# Patient Record
Sex: MALE | Race: ASIAN | NOT HISPANIC OR LATINO | Employment: UNEMPLOYED | URBAN - METROPOLITAN AREA
[De-identification: names, ages, dates, MRNs, and addresses within clinical notes are randomized per-mention and may not be internally consistent; named-entity substitution may affect disease eponyms.]

---

## 2019-11-22 ENCOUNTER — HOSPITAL ENCOUNTER (EMERGENCY)
Facility: HOSPITAL | Age: 44
End: 2019-11-23
Attending: EMERGENCY MEDICINE

## 2019-11-22 VITALS
HEART RATE: 88 BPM | SYSTOLIC BLOOD PRESSURE: 140 MMHG | RESPIRATION RATE: 20 BRPM | OXYGEN SATURATION: 99 % | TEMPERATURE: 97.5 F | DIASTOLIC BLOOD PRESSURE: 80 MMHG

## 2019-11-22 DIAGNOSIS — F29 PSYCHOSIS (HCC): ICD-10-CM

## 2019-11-22 DIAGNOSIS — F30.10 MANIC BEHAVIOR (HCC): Primary | ICD-10-CM

## 2019-11-22 LAB
ALBUMIN SERPL BCP-MCNC: 4.2 G/DL (ref 3.5–5)
ALP SERPL-CCNC: 75 U/L (ref 46–116)
ALT SERPL W P-5'-P-CCNC: 54 U/L (ref 12–78)
AMPHETAMINES SERPL QL SCN: POSITIVE
ANION GAP SERPL CALCULATED.3IONS-SCNC: 13 MMOL/L (ref 4–13)
AST SERPL W P-5'-P-CCNC: 35 U/L (ref 5–45)
BARBITURATES UR QL: NEGATIVE
BASOPHILS # BLD AUTO: 0.05 THOUSANDS/ΜL (ref 0–0.1)
BASOPHILS NFR BLD AUTO: 1 % (ref 0–1)
BENZODIAZ UR QL: NEGATIVE
BILIRUB SERPL-MCNC: 1.11 MG/DL (ref 0.2–1)
BUN SERPL-MCNC: 13 MG/DL (ref 5–25)
CALCIUM SERPL-MCNC: 9.8 MG/DL (ref 8.3–10.1)
CHLORIDE SERPL-SCNC: 103 MMOL/L (ref 100–108)
CO2 SERPL-SCNC: 25 MMOL/L (ref 21–32)
COCAINE UR QL: NEGATIVE
CREAT SERPL-MCNC: 0.99 MG/DL (ref 0.6–1.3)
EOSINOPHIL # BLD AUTO: 0.04 THOUSAND/ΜL (ref 0–0.61)
EOSINOPHIL NFR BLD AUTO: 0 % (ref 0–6)
ERYTHROCYTE [DISTWIDTH] IN BLOOD BY AUTOMATED COUNT: 12.9 % (ref 11.6–15.1)
ETHANOL EXG-MCNC: 0 MG/DL
GFR SERPL CREATININE-BSD FRML MDRD: 93 ML/MIN/1.73SQ M
GLUCOSE SERPL-MCNC: 86 MG/DL (ref 65–140)
HCT VFR BLD AUTO: 47.2 % (ref 36.5–49.3)
HGB BLD-MCNC: 16.1 G/DL (ref 12–17)
IMM GRANULOCYTES # BLD AUTO: 0.03 THOUSAND/UL (ref 0–0.2)
IMM GRANULOCYTES NFR BLD AUTO: 0 % (ref 0–2)
LYMPHOCYTES # BLD AUTO: 1.42 THOUSANDS/ΜL (ref 0.6–4.47)
LYMPHOCYTES NFR BLD AUTO: 15 % (ref 14–44)
MCH RBC QN AUTO: 29.1 PG (ref 26.8–34.3)
MCHC RBC AUTO-ENTMCNC: 34.1 G/DL (ref 31.4–37.4)
MCV RBC AUTO: 85 FL (ref 82–98)
METHADONE UR QL: NEGATIVE
MONOCYTES # BLD AUTO: 0.38 THOUSAND/ΜL (ref 0.17–1.22)
MONOCYTES NFR BLD AUTO: 4 % (ref 4–12)
NEUTROPHILS # BLD AUTO: 7.55 THOUSANDS/ΜL (ref 1.85–7.62)
NEUTS SEG NFR BLD AUTO: 80 % (ref 43–75)
NRBC BLD AUTO-RTO: 0 /100 WBCS
OPIATES UR QL SCN: NEGATIVE
PCP UR QL: NEGATIVE
PLATELET # BLD AUTO: 168 THOUSANDS/UL (ref 149–390)
PMV BLD AUTO: 10.4 FL (ref 8.9–12.7)
POTASSIUM SERPL-SCNC: 4 MMOL/L (ref 3.5–5.3)
PROT SERPL-MCNC: 7.8 G/DL (ref 6.4–8.2)
RBC # BLD AUTO: 5.53 MILLION/UL (ref 3.88–5.62)
SODIUM SERPL-SCNC: 141 MMOL/L (ref 136–145)
THC UR QL: POSITIVE
TSH SERPL DL<=0.05 MIU/L-ACNC: 0.79 UIU/ML (ref 0.36–3.74)
WBC # BLD AUTO: 9.47 THOUSAND/UL (ref 4.31–10.16)

## 2019-11-22 PROCEDURE — 84443 ASSAY THYROID STIM HORMONE: CPT | Performed by: EMERGENCY MEDICINE

## 2019-11-22 PROCEDURE — 85025 COMPLETE CBC W/AUTO DIFF WBC: CPT | Performed by: EMERGENCY MEDICINE

## 2019-11-22 PROCEDURE — 82075 ASSAY OF BREATH ETHANOL: CPT | Performed by: EMERGENCY MEDICINE

## 2019-11-22 PROCEDURE — 80307 DRUG TEST PRSMV CHEM ANLYZR: CPT | Performed by: EMERGENCY MEDICINE

## 2019-11-22 PROCEDURE — 99283 EMERGENCY DEPT VISIT LOW MDM: CPT | Performed by: EMERGENCY MEDICINE

## 2019-11-22 PROCEDURE — 93005 ELECTROCARDIOGRAM TRACING: CPT

## 2019-11-22 PROCEDURE — 99285 EMERGENCY DEPT VISIT HI MDM: CPT

## 2019-11-22 PROCEDURE — 80053 COMPREHEN METABOLIC PANEL: CPT | Performed by: EMERGENCY MEDICINE

## 2019-11-22 PROCEDURE — 36415 COLL VENOUS BLD VENIPUNCTURE: CPT | Performed by: EMERGENCY MEDICINE

## 2019-11-22 NOTE — ED PROCEDURE NOTE
PROCEDURE  ECG 12 Lead Documentation Only  Date/Time: 11/22/2019 3:18 PM  Performed by: Kylie Ruano MD  Authorized by: Kylie Ruano MD     Indications / Diagnosis:   acting strangly   ECG reviewed by me, the ED Provider: yes    Patient location:  ED and bedside  Previous ECG:     Previous ECG:  Unavailable    Comparison to cardiac monitor: Yes    Interpretation:     Interpretation: non-specific    Rate:     ECG rate:  75    ECG rate assessment: normal    Rhythm:     Rhythm: sinus rhythm    Ectopy:     Ectopy: none    QRS:     QRS axis:  Normal    QRS intervals:  Normal  Conduction:     Conduction: abnormal      Abnormal conduction: incomplete RBBB    ST segments:     ST segments:  Normal  T waves:     T waves: flattening      Flattening:  AVL, III, V1, V5 and V6  Q waves:     Q waves:  V1  Comments:      No ecg signs of ischemia/ injury          Kylie Ruano MD  11/22/19 9835

## 2019-11-23 ENCOUNTER — HOSPITAL ENCOUNTER (INPATIENT)
Facility: HOSPITAL | Age: 44
LOS: 4 days | Discharge: HOME | DRG: 885 | End: 2019-11-27
Attending: PSYCHIATRY & NEUROLOGY | Admitting: PSYCHIATRY & NEUROLOGY

## 2019-11-23 DIAGNOSIS — F30.10 MANIC BEHAVIOR (HCC): ICD-10-CM

## 2019-11-23 DIAGNOSIS — F31.12 BIPOLAR DISORDER, CURR EPISODE MANIC W/O PSYCHOTIC FEATURES, MODERATE (HCC): Primary | ICD-10-CM

## 2019-11-23 PROBLEM — F90.2 ATTENTION DEFICIT HYPERACTIVITY DISORDER (ADHD), COMBINED TYPE: Status: ACTIVE | Noted: 2019-11-23

## 2019-11-23 PROBLEM — F29 PSYCHOSIS (HCC): Status: ACTIVE | Noted: 2019-11-23

## 2019-11-23 PROBLEM — F12.10 MARIJUANA ABUSE: Status: ACTIVE | Noted: 2019-11-23

## 2019-11-23 PROBLEM — Z00.8 MEDICAL CLEARANCE FOR PSYCHIATRIC ADMISSION: Status: ACTIVE | Noted: 2019-11-23

## 2019-11-23 PROCEDURE — 99253 IP/OBS CNSLTJ NEW/EST LOW 45: CPT | Performed by: FAMILY MEDICINE

## 2019-11-23 PROCEDURE — 99222 1ST HOSP IP/OBS MODERATE 55: CPT | Performed by: PSYCHIATRY & NEUROLOGY

## 2019-11-23 RX ORDER — OLANZAPINE 5 MG/1
5 TABLET ORAL EVERY 6 HOURS PRN
Status: DISCONTINUED | OUTPATIENT
Start: 2019-11-23 | End: 2019-11-27 | Stop reason: HOSPADM

## 2019-11-23 RX ORDER — HALOPERIDOL 5 MG
5 TABLET ORAL EVERY 6 HOURS PRN
Status: DISCONTINUED | OUTPATIENT
Start: 2019-11-23 | End: 2019-11-27 | Stop reason: HOSPADM

## 2019-11-23 RX ORDER — HALOPERIDOL 5 MG/ML
5 INJECTION INTRAMUSCULAR EVERY 6 HOURS PRN
Status: DISCONTINUED | OUTPATIENT
Start: 2019-11-23 | End: 2019-11-27 | Stop reason: HOSPADM

## 2019-11-23 RX ORDER — OLANZAPINE 10 MG/1
5 INJECTION, POWDER, LYOPHILIZED, FOR SOLUTION INTRAMUSCULAR EVERY 6 HOURS PRN
Status: DISCONTINUED | OUTPATIENT
Start: 2019-11-23 | End: 2019-11-27 | Stop reason: HOSPADM

## 2019-11-23 RX ORDER — LORAZEPAM 1 MG/1
1 TABLET ORAL EVERY 6 HOURS PRN
Status: DISCONTINUED | OUTPATIENT
Start: 2019-11-23 | End: 2019-11-27 | Stop reason: HOSPADM

## 2019-11-23 RX ORDER — MAGNESIUM HYDROXIDE/ALUMINUM HYDROXICE/SIMETHICONE 120; 1200; 1200 MG/30ML; MG/30ML; MG/30ML
30 SUSPENSION ORAL EVERY 4 HOURS PRN
Status: CANCELLED | OUTPATIENT
Start: 2019-11-23

## 2019-11-23 RX ORDER — DEXTROAMPHETAMINE SACCHARATE, AMPHETAMINE ASPARTATE MONOHYDRATE, DEXTROAMPHETAMINE SULFATE AND AMPHETAMINE SULFATE 2.5; 2.5; 2.5; 2.5 MG/1; MG/1; MG/1; MG/1
10 CAPSULE, EXTENDED RELEASE ORAL EVERY MORNING
COMMUNITY
End: 2019-11-27 | Stop reason: HOSPADM

## 2019-11-23 RX ORDER — MAGNESIUM HYDROXIDE/ALUMINUM HYDROXICE/SIMETHICONE 120; 1200; 1200 MG/30ML; MG/30ML; MG/30ML
30 SUSPENSION ORAL EVERY 4 HOURS PRN
Status: DISCONTINUED | OUTPATIENT
Start: 2019-11-23 | End: 2019-11-27 | Stop reason: HOSPADM

## 2019-11-23 RX ORDER — TRAZODONE HYDROCHLORIDE 50 MG/1
50 TABLET ORAL
Status: CANCELLED | OUTPATIENT
Start: 2019-11-23

## 2019-11-23 RX ORDER — HALOPERIDOL 5 MG
5 TABLET ORAL EVERY 6 HOURS PRN
Status: CANCELLED | OUTPATIENT
Start: 2019-11-23

## 2019-11-23 RX ORDER — LORAZEPAM 2 MG/ML
1 INJECTION INTRAMUSCULAR EVERY 6 HOURS PRN
Status: DISCONTINUED | OUTPATIENT
Start: 2019-11-23 | End: 2019-11-27 | Stop reason: HOSPADM

## 2019-11-23 RX ORDER — ACETAMINOPHEN 325 MG/1
650 TABLET ORAL EVERY 6 HOURS PRN
Status: DISCONTINUED | OUTPATIENT
Start: 2019-11-23 | End: 2019-11-27 | Stop reason: HOSPADM

## 2019-11-23 RX ORDER — TRAZODONE HYDROCHLORIDE 50 MG/1
50 TABLET ORAL
Status: DISCONTINUED | OUTPATIENT
Start: 2019-11-23 | End: 2019-11-27 | Stop reason: HOSPADM

## 2019-11-23 RX ORDER — LORAZEPAM 1 MG/1
1 TABLET ORAL EVERY 6 HOURS PRN
Status: CANCELLED | OUTPATIENT
Start: 2019-11-23

## 2019-11-23 RX ORDER — BENZTROPINE MESYLATE 1 MG/1
1 TABLET ORAL EVERY 6 HOURS PRN
Status: DISCONTINUED | OUTPATIENT
Start: 2019-11-23 | End: 2019-11-27 | Stop reason: HOSPADM

## 2019-11-23 RX ORDER — ACETAMINOPHEN 325 MG/1
650 TABLET ORAL EVERY 6 HOURS PRN
Status: CANCELLED | OUTPATIENT
Start: 2019-11-23

## 2019-11-23 RX ORDER — BENZTROPINE MESYLATE 1 MG/ML
1 INJECTION INTRAMUSCULAR; INTRAVENOUS EVERY 6 HOURS PRN
Status: CANCELLED | OUTPATIENT
Start: 2019-11-23

## 2019-11-23 RX ORDER — OLANZAPINE 10 MG/1
10 TABLET, ORALLY DISINTEGRATING ORAL
Status: DISCONTINUED | OUTPATIENT
Start: 2019-11-23 | End: 2019-11-25

## 2019-11-23 RX ORDER — ZIPRASIDONE MESYLATE 20 MG/ML
10 INJECTION, POWDER, LYOPHILIZED, FOR SOLUTION INTRAMUSCULAR EVERY 6 HOURS PRN
Status: DISCONTINUED | OUTPATIENT
Start: 2019-11-23 | End: 2019-11-27 | Stop reason: HOSPADM

## 2019-11-23 RX ORDER — ZIPRASIDONE HYDROCHLORIDE 20 MG/1
20 CAPSULE ORAL EVERY 6 HOURS PRN
Status: DISCONTINUED | OUTPATIENT
Start: 2019-11-23 | End: 2019-11-27 | Stop reason: HOSPADM

## 2019-11-23 RX ORDER — BENZTROPINE MESYLATE 1 MG/ML
1 INJECTION INTRAMUSCULAR; INTRAVENOUS EVERY 6 HOURS PRN
Status: DISCONTINUED | OUTPATIENT
Start: 2019-11-23 | End: 2019-11-27 | Stop reason: HOSPADM

## 2019-11-23 RX ORDER — LORAZEPAM 2 MG/ML
1 INJECTION INTRAMUSCULAR EVERY 6 HOURS PRN
Status: CANCELLED | OUTPATIENT
Start: 2019-11-23

## 2019-11-23 RX ORDER — HALOPERIDOL 5 MG/ML
5 INJECTION INTRAMUSCULAR EVERY 6 HOURS PRN
Status: CANCELLED | OUTPATIENT
Start: 2019-11-23

## 2019-11-23 RX ORDER — BENZTROPINE MESYLATE 0.5 MG/1
1 TABLET ORAL EVERY 6 HOURS PRN
Status: CANCELLED | OUTPATIENT
Start: 2019-11-23

## 2019-11-23 RX ADMIN — OLANZAPINE 10 MG: 10 TABLET, ORALLY DISINTEGRATING ORAL at 21:30

## 2019-11-23 RX ADMIN — HALOPERIDOL 5 MG: 5 TABLET ORAL at 17:40

## 2019-11-23 RX ADMIN — LORAZEPAM 1 MG: 1 TABLET ORAL at 17:40

## 2019-11-23 RX ADMIN — BENZTROPINE MESYLATE 1 MG: 1 TABLET ORAL at 03:27

## 2019-11-23 RX ADMIN — LORAZEPAM 1 MG: 1 TABLET ORAL at 03:27

## 2019-11-23 RX ADMIN — HALOPERIDOL 5 MG: 5 TABLET ORAL at 03:27

## 2019-11-23 RX ADMIN — BENZTROPINE MESYLATE 1 MG: 1 TABLET ORAL at 17:40

## 2019-11-23 NOTE — PROGRESS NOTES
Pt presents as manic, disorganized, grandiose, manipulative, quickly dismissive towards staff and angers easily  Attempt made to hold conversation with pt; pt monopolized conversation, talking over RN  When limits were set, pt responds with "alright, then we're done here, you're not even listening " Content was pt explaining his background, his "mathematical formula for comedy" which he explained for prolonged period  Pt admitted at one point he "is delusional, probably due to being overworked " Overall thought pattern is tangential, inconstant with reality, associations are loosely connected  Denies SI, HI, hallucinations  Pt states "you're not keeping me here, I'm keeping me here  You can't keep Josh Lee in a [employer]inder[employer] " Pt also makes numerous inappropriate comments of sexual nature at times  After conversation needed to be cut short, pt regretfully agreed to take recommended ativan, cogentin and haldol after content grew more hostile with pt at one point banging his hand down on the table  Security walk through took place at this time

## 2019-11-23 NOTE — PROGRESS NOTES
Pt in day room talking about all his theories that he is solving and he needs to sit with only people with high IQ like he has because " the lower IQ people will not be able to comprehend what he is trying to say to them  He remains with bizarre, grandiose and disorganized thoughts but is polite and cooperative

## 2019-11-23 NOTE — ED NOTES
Per promise, patient not on file, also patient has no insurance on chart and denies current coverage

## 2019-11-23 NOTE — H&P
Psychiatric Evaluation - [facility] 38 y o  male MRN: [id number]  Unit/Bed#: [facility] 340-01 Encounter: [id number]    Assessment/Plan   Principal Problem:    Psychosis [facility]  Active Problems:    Medical clearance for psychiatric admission    Attention deficit hyperactivity disorder (ADHD), combined type    Marijuana abuse    Plan:     D/C Adderall    Risks, benefits and possible side effects of Medications:   Patient does not verbalize understanding at this time and will require further explanation  Chief Complaint: brought in by police    History of Present Illness     Patient is a 38 y o  male presents with Signs of acute psychosis  Patient was admitted to psychiatric unit on a involuntarily 302 commitment basis  Primary complaints include: agitation, difficulty sleeping, financial problems and increased irritability  Onset of symptoms was gradual starting several months ago with gradually worsening course since that time  Psychosocial Stressors: family, financial, health, marital, occupational, drug and alcohol and social     Patient presents to the Emergency Room after police found patient sleeping [place] his [place] of the [place]  Patient then acted erratic in front of police and patient was brought in for evaluation  Patient is too disorganized and tangential to provide meaningful history  According to chart he  has a lot of stressors including losing his home and 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  He stated he does not remember falling asleep on the side of the road  Patient is grandiose with flight of ideas  He denies homicidal ideation  He only takes Adderall XR prescribed by his PCP  UDS positive for amphetamines  RN spoke to sister and sister stated she is not aware he had ADHD and she believes he first started taking Adderall through his GF         Patient has been resting most of the day after receiving prn for agitation around 4 am today         Psychiatric Review Of Systems:  sleep: yes  appetite changes: no  weight changes: no  energy/anergy: yes  interest/pleasure/anhedonia: no  somatic symptoms: no  anxiety/panic: no  ricardo: yes  guilty/hopeless: no  self injurious behavior/risky behavior: no    Historical Information     Past Psychiatric History:   None  Currently takes Adderall XR 30 mg daily prescribed by his PCP for ADHD    Substance Abuse History:  Social History     Tobacco History     Smoking Status  Never Smoker    Smokeless Tobacco Use  Never Used          Alcohol History     Alcohol Use Status  Not Currently Comment  LAINE          Drug Use     Drug Use Status  Yes Types  Marijuana          Sexual Activity     Sexually Active  Not Currently Comment  LAINE          Activities of Daily Living    Not Asked               Additional Substance Use Detail     Questions Responses    Substance Use Assessment Substance use within the past 12 months    Alcohol Use Frequency Experimented    Cannabis frequency 3 or more times/week    Comment: 3 or more times/week on 11/23/2019     Heroin Frequency Denies use in past 12 months    Cocaine frequency Never used    Comment: Never used on 11/23/2019     Crack Cocaine Frequency Denies use in past 12 months    Methamphetamine Frequency Denies use in past 12 months    Narcotic Frequency Denies use in past 12 months    Benzodiazepine Frequency Denies use in past 12 months    Amphetamine frequency Denies use in past 12 months    Barbituate Frequency Denies use use in past 12 months    Inhalant frequency Never used    Comment: Never used on 11/23/2019     Hallucinogen frequency Never used    Comment: Never used on 11/23/2019     Ecstasy frequency Never used    Comment: Never used on 11/23/2019     Other drug frequency Never used    Comment: Never used on 11/23/2019     Opiate frequency Denies use in past 12 months    Last reviewed by Alyx Lazo MD on 11/23/2019        I have assessed this patient for substance use within the past 12 months    Family Psychiatric History:   Psychiatric Illness none Hospitalization: none    Social History:  Education: high school diploma/GED  Learning Disabilities: denies  Marital history:   Living arrangement, social support: The patient is presently homeless  Occupational History: self-employed  Functioning Relationships: good support system  Other Pertinent History: None      Traumatic History:   Abuse: denies  Other Traumatic Events: n/a    Past Medical History:   Diagnosis Date    ADHD (attention deficit hyperactivity disorder)     Anxiety     Psychiatric illness     Psychosis ([facility] ) 11/23/2019    Substance abuse ([facility] )        Medical Review Of Systems:  Pertinent items are noted in HPI      Meds/Allergies   all current active meds have been reviewed and current meds:   Current Facility-Administered Medications   Medication Dose Route Frequency    acetaminophen (TYLENOL) tablet 650 mg  650 mg Oral Q6H PRN    aluminum-magnesium hydroxide-simethicone (MYLANTA) 200-200-20 mg/5 mL oral suspension 30 mL  30 mL Oral Q4H PRN    benztropine (COGENTIN) injection 1 mg  1 mg Intramuscular Q6H PRN    benztropine (COGENTIN) tablet 1 mg  1 mg Oral Q6H PRN    haloperidol (HALDOL) tablet 5 mg  5 mg Oral Q6H PRN    haloperidol lactate (HALDOL) injection 5 mg  5 mg Intramuscular Q6H PRN    LORazepam (ATIVAN) 2 mg/mL injection 1 mg  1 mg Intramuscular Q6H PRN    LORazepam (ATIVAN) tablet 1 mg  1 mg Oral Q6H PRN    magnesium hydroxide (MILK OF MAGNESIA) 400 mg/5 mL oral suspension 30 mL  30 mL Oral Daily PRN    OLANZapine (ZyPREXA ZYDIS) dispersible tablet 10 mg  10 mg Oral HS    OLANZapine (ZyPREXA) IM injection 5 mg  5 mg Intramuscular Q6H PRN    OLANZapine (ZyPREXA) tablet 5 mg  5 mg Oral Q6H PRN    traZODone (DESYREL) tablet 50 mg  50 mg Oral HS PRN    ziprasidone (GEODON) capsule 20 mg  20 mg Oral Q6H PRN    ziprasidone (GEODON) IM injection 10 mg  10 mg Intramuscular Q6H PRN     No Known Allergies    Objective   Vital signs in last 24 hours:  Temp:  [97 °F (36 1 °C)-97 2 °F (36 2 °C)] 97 °F (36 1 °C)  HR:  [] 78  Resp:  [16-20] 16  BP: (113-144)/(72-99) 137/78    No intake or output data in the 24 hours ending 11/23/19 1917    Mental Status Evaluation:  Appearance:  age appropriate and disheveled   Behavior:  evasive, guarded and uncooperative   Speech:  pressured   Mood:  irritable and labile   Affect:  labile   Language: anomia No and aphasia  No   Thought Process:  disorganized and tangential   Thought Content:  delusions  grandiose   Perceptual Disturbances: None   Risk Potential: Suicidal Ideations none, Homicidal Ideations none and Potential for Aggression No   Sensorium:  person and place   Cognition:  grossly intact   Consciousness:  alert and awake    Attention: attention span appeared shorter than expected for age   Intellect: not examined   Fund of Knowledge: awareness of current events: is limited   Insight:  limited   Judgment: limited   Muscle Strength and Tone: not examined   Gait/Station: normal gait/station and normal balance   Motor Activity: no abnormal movements     Lab Results: I have personally reviewed pertinent lab results  Imaging Studies: n/a  EKG, Pathology, and Other Studies:     Code Status: Level 1 - Full Code  Advance Directive and Living Will:      Power of :    POLST:        Patient Strengths/Assets: supportive family/friends    Patient Barriers/Limitations: patient is on an involuntary commitment    Counseling / Coordination of Care  Total floor / unit time spent today n/a minutes  Greater than 50% of total time was spent with the patient and / or family counseling and / or coordination of care   A description of the counseling / coordination of care:

## 2019-11-23 NOTE — PROGRESS NOTES
Pt is a 89, who presented to  AN-ED due to police finding him sleeping in his vehicle on the side of the road and pt began exhibiting bizarre behavioral  Pt presented to the unit disorganized and manic  Pt had loud, rambling, rapid, repetitive speech  Pt was unable to answer any admission questions  Pt kept talking about Holly Beltran and how he needs to speak with someone with the same IQ  Pt stated "I may look like a tow truck but you will all want my sperm because of how smart I am and will want to use it for IVR " Pt continued to ramble about president Bebeto Shook and on how smart he is and can solve things that do not have cures yet but he needs Holly Beltran to carry out his ideas  Will monitor

## 2019-11-23 NOTE — ED NOTES
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  However, family believes business is also closing patient does not report this  Patient will state one minute he is upset about owing so much money and people believing he is a bad man, however in the next statement will say I am not suicidal I am the richest man in the world, rich people dont die   Patient does not stay on topic and will at times stop speaking as he believes his brain is able to send messages directly to others brains  Patient sates he does not remember falling asleep on the side of the road but rather his brain would just know its time  Patient got upset with this writer and shook head while telling writer he was beating you with my brain   Patient is carrying an autobiography that he states will save the world  Patient allowed me to look through and there was various papers, some colored, some with numbers, some with different thoughts written down  When first asked about inpatient treatment patient got very concerned about the toilets bill soni needs   Patient is unable to say when he last ate, or what day it is  Patients family reports patient driving three hours to prove stuff to them and showing them papers that made no sense and were handwritten by patient  Patient is grandiose with flight of ideas  Patient believes I am invisible  Patient denies homicidal ideation, never answers suicidal ideation questions directly    When asked about Hallucinations patient states you would ask that  Patient interrupted intake multiple times for things such as did you feel that? I am moving your glasses now you are cured  Patient is preoccupied with his finances and goes back and forth between being worried and in a significant amount of debt, to he has abundance of money and is saving the wo5rld  Patient states he takes Adderall however gives different dosages and frequencies everytime it is brought up  Patient appears to be responding to internal stimuli at times  Patient was offered a 201 multiple times and declined, pateint also unable to verbalize understanding of the process  Patient is on a 302 hold, he was handed his rights and explained them, however, then told me I wrote them  Patient does not appear to understand his rights           Patient's family supplied phone number [phone] [employer]), patient has given verbal permission to inform them of disposition

## 2019-11-23 NOTE — CONSULTS
Consult- Sasha Maya 1975, 38 y o  male MRN: [id number]    Unit/Bed#: [facility] 340-01 Encounter: [id number]    Primary Care Provider: No primary care provider on file  Date and time admitted to hospital: 11/23/2019  2:37 AM      Inpatient consult for Medical Clearance for [facility] patient  Consult performed by: Marcia Huitron MD  Consult ordered by: Bassam Navarro MD          Medical clearance for psychiatric admission  Assessment & Plan  Reviewed metabolic profile and EKG  Patient is medically cleared for inpatient behavioral health treatment    Marijuana abuse  Assessment & Plan  Advised to avoid marijuana abuse    Attention deficit hyperactivity disorder (ADHD), combined type  Assessment & Plan  Patient takes Adderall at home  Further as per psych        VTE Prophylaxis: Reason for no pharmacologic prophylaxis None  / reason for no mechanical VTE prophylaxis None     Recommendations for Discharge:  · None    Counseling / Coordination of Care Time: 45 minutes  Greater than 50% of total time spent on patient counseling and coordination of care  Collaboration of Care: Were Recommendations Directly Discussed with Primary Treatment Team? 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    Review of Systems:    Review of Systems   Constitutional: Negative for appetite change, chills, fatigue and fever     HENT: Negative for hearing loss, sore throat and trouble swallowing  Eyes: Negative for photophobia, discharge and visual disturbance  Respiratory: Negative for chest tightness and shortness of breath  Cardiovascular: Negative for chest pain and palpitations  Gastrointestinal: Negative for abdominal pain, blood in stool and vomiting  Endocrine: Negative for polydipsia and polyuria  Genitourinary: Negative for difficulty urinating, dysuria, flank pain and hematuria  Musculoskeletal: Negative for back pain and gait problem  Skin: Negative for rash  Allergic/Immunologic: Negative for environmental allergies and food allergies  Neurological: Negative for dizziness, seizures, syncope and headaches  Hematological: Does not bruise/bleed easily  Psychiatric/Behavioral: Positive for behavioral problems, decreased concentration and sleep disturbance  The patient is nervous/anxious and is hyperactive  All other systems reviewed and are negative        Past Medical and Surgical History:     Past Medical History:   Diagnosis Date    ADHD (attention deficit hyperactivity disorder)     Anxiety     Psychiatric illness     [facility]        Past Surgical History:   Procedure Laterality Date    APPENDECTOMY      LASIK         Meds/Allergies:    all medications and allergies reviewed    Allergies: No Known Allergies    Social History:     Marital Status:     Substance Use History:   Social History     Substance and Sexual Activity   Alcohol Use Not Currently    Comment: LAINE     Social History     Tobacco Use   Smoking Status Never Smoker   Smokeless Tobacco Never Used     Social History     Substance and Sexual Activity   Drug Use Yes    Types: Marijuana       Family History:    Family History   Problem Relation Age of Onset    Hypertension Mother        Physical Exam:     Vitals:   Blood Pressure: 144/99 (11/23/19 0240)  Pulse: 105 (11/23/19 0240)  Temperature: (!) 97 2 °F (36 2 °C) (11/23/19 0240)  Temp Source: Temporal (11/23/19 0240)  Respirations: 18 (11/23/19 0240)  Height: 5' 9" (175 3 cm) (11/23/19 0240)  Weight - Scale: 83 9 kg (185 lb) (11/23/19 0240)  SpO2: 97 % (11/23/19 0240)    Physical Exam   Constitutional: He is oriented to person, place, and time  He appears well-developed and well-nourished  HENT:   Head: Normocephalic and atraumatic  Right Ear: External ear normal    Left Ear: External ear normal    Mouth/Throat: Oropharynx is clear and moist    Eyes: Pupils are equal, round, and reactive to light  Conjunctivae and EOM are normal    Neck: Normal range of motion  Neck supple  Cardiovascular: Normal rate, regular rhythm, normal heart sounds and intact distal pulses  Pulmonary/Chest: Effort normal and breath sounds normal    Abdominal: Soft  Bowel sounds are normal  He exhibits no mass  There is no tenderness  There is no rebound and no guarding  Genitourinary:   Genitourinary Comments: deferred   Musculoskeletal: Normal range of motion  Neurological: He is alert and oriented to person, place, and time  He has normal reflexes  Cranial nerves 2-12 are normal   Normal neurological exam   Skin: Skin is warm and dry  No rash noted  Psychiatric:   bizarre thought and judgment   Nursing note and vitals reviewed  Additional Data:     Lab Results: I have personally reviewed pertinent reports  Results from last 7 days   Lab Units 11/22/19  1849   WBC Thousand/uL 9 47   HEMOGLOBIN g/dL 16 1   HEMATOCRIT % 47 2   PLATELETS Thousands/uL 168   NEUTROS PCT % 80*   LYMPHS PCT % 15   MONOS PCT % 4   EOS PCT % 0     Results from last 7 days   Lab Units 11/22/19  1849   SODIUM mmol/L 141   POTASSIUM mmol/L 4 0   CHLORIDE mmol/L 103   CO2 mmol/L 25   BUN mg/dL 13   CREATININE mg/dL 0 99   ANION GAP mmol/L 13   CALCIUM mg/dL 9 8   ALBUMIN g/dL 4 2   TOTAL BILIRUBIN mg/dL 1 11*   ALK PHOS U/L 75   ALT U/L 54   AST U/L 35   GLUCOSE RANDOM mg/dL 86             No results found for: HGBA1C            Imaging:  I have personally reviewed pertinent reports  No orders to display       EKG, Pathology, and Other Studies Reviewed on Admission:   · EKG:  Sinus rhythm with nonspecific ST segment changes and incomplete right bundle branch block as per report    ** Please Note: This note has been constructed using a voice recognition system   **

## 2019-11-23 NOTE — PROGRESS NOTES
Pt given PRN ativan, cogentin and haldol  Ham scale score 18, broset violent checklist 1 for moderate agitation  Will monitor

## 2019-11-23 NOTE — CMS CERTIFICATION NOTE
Certification: Based upon physical, mental and social evaluations, I certify that inpatient psychiatric services are medically necessary for this patient for a duration of 2 midnights for the treatment of Psychosis  Available alternative community resources do not meet the patient's mental health care needs  I further attest that an established written individualized plan of care has been implemented and is outlined in the patient's medical records

## 2019-11-23 NOTE — ED NOTES
Patient is accepted at [facility]  Patient is accepted by Dr Addison Newbyside is arranged with [employer] is scheduled for 2am        Nurse report is to be called to [phone] prior to patient transfer

## 2019-11-23 NOTE — PROGRESS NOTES
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  He is  and has a 15 yr old daughter  His sister stated that he was not diagnosed with ADHD as a child like he is stating and she has always been very close to him so she knows his history  Smith Cox did say that he was dating a psychologist who the family believes introduced him to Adderral and that is why he was taking Adderral   His sister stated that ever since he started Adderral he has been acting strange and has not been sleeping  She stated yesterday when he stopped over to see her he was rambling and would not stop talking but then all of a sudden he would start to cry  She stated that she has never seen her brother act this way before and she was very surprised to see that  She and her sister from [place] will be stopping by this weekend to see pt

## 2019-11-23 NOTE — PLAN OF CARE
Problem: NANCY  Goal: Will exhibit normal sleep and speech and no impulsivity  Description  INTERVENTIONS:  - Administer medication as ordered  - Set limits on impulsive behavior  - Make attempts to decrease external stimuli as possible  Outcome: Not Progressing     Problem: PSYCHOSIS  Goal: Will report no hallucinations or delusions  Description  Interventions:  - Administer medication as  ordered  - Every waking shifts and PRN assess for the presence of hallucinations and or delusions  - Assist with reality testing to support increasing orientation  - Assess if patient's hallucinations or delusions are encouraging self-harm or harm to others and intervene as appropriate  Outcome: Not Progressing     Problem: SUBSTANCE USE/ABUSE  Goal: Will have no detox symptoms and will verbalize plan for changing substance-related behavior  Description  INTERVENTIONS:  - Monitor physical status and assess for symptoms of withdrawal  - Administer medication as ordered  - Provide emotional support with 1 on 1 interaction with staff  - Encourage recovery focused program/ addiction education  - Assess for verbalization of changing behaviors related to substance abuse  - Initiate consults and referrals as appropriate (Case Management, Spiritual Care, etc )  Outcome: Not Progressing  Goal: By discharge, will develop insight into their chemical dependency and sustain motivation to continue in recovery  Description  INTERVENTIONS:  - Attends all daily group sessions and scheduled AA groups  - Actively practices coping skills through participation in the therapeutic community and adherence to program rules  - Reviews and completes assignments from individual treatment plan  - Assist patient development of understanding of their personal cycle of addiction and relapse triggers  Outcome: Not Progressing  Goal: By discharge, patient will have ongoing treatment plan addressing chemical dependency  Description  INTERVENTIONS:  - Assist patient with resources and/or appointments for ongoing recovery based living  Outcome: Not Progressing     Problem: INVOLUNTARY ADMIT  Goal: Will cooperate with staff recommendations and doctor's orders and will demonstrate appropriate behavior  Description  INTERVENTIONS:  - Treat underlying conditions and offer medication as ordered  - Educate regarding involuntary admission procedures and rules  - Utilize positive consistent limit setting strategies to support patient and staff safety  Outcome: Not Progressing

## 2019-11-23 NOTE — ED PROVIDER NOTES
History  Chief Complaint   Patient presents with    Insomnia     Pt  c/o not sleeping for days due to "writing his autobiography"  PSP found pt  parked on shoulder of [address], [address] in and out of sleep  Pt  denies any PMH   Psychiatric Evaluation     38 yr male- with no phm-- for approx last month with   Bizarre behavior-- manic type behavior at work--  A lot of recent stressors in life- found asleep in car by side of road by state police-  No trauma- and awoke acting strangely ems called  And pt sent to  er --       History provided by:  Patient   used: No    Psychiatric Evaluation   Presenting symptoms: no agitation, no hallucinations, no self-mutilation and no suicidal thoughts    Associated symptoms: no anxiety        None       No past medical history on file  No past surgical history on file  No family history on file  I have reviewed and agree with the history as documented  Social History     Tobacco Use    Smoking status: Not on file   Substance Use Topics    Alcohol use: Not on file    Drug use: Not on file        Review of Systems   Constitutional: Negative  HENT: Negative  Eyes: Negative  Respiratory: Negative  Cardiovascular: Negative  Gastrointestinal: Negative  Endocrine: Negative  Genitourinary: Negative  Musculoskeletal: Negative  Skin: Negative  Allergic/Immunologic: Negative  Neurological: Negative  Hematological: Negative  Psychiatric/Behavioral: Positive for sleep disturbance  Negative for agitation, behavioral problems, confusion, decreased concentration, dysphoric mood, hallucinations, self-injury and suicidal ideas  The patient is hyperactive  The patient is not nervous/anxious  Physical Exam  Physical Exam   Constitutional: No distress  avss- pulse ox  99 % on ra- interpretation is normal- no intervention-    HENT:   Head: Normocephalic and atraumatic     Eyes: Pupils are equal, round, and reactive to light  Conjunctivae and EOM are normal  Right eye exhibits no discharge  Left eye exhibits no discharge  No scleral icterus  Mm pink   Neck: Normal range of motion  Neck supple  No JVD present  No tracheal deviation present  No thyromegaly present  Cardiovascular: Normal rate, regular rhythm, normal heart sounds and intact distal pulses  Exam reveals no gallop and no friction rub  No murmur heard  Pulmonary/Chest: Effort normal and breath sounds normal  No stridor  No respiratory distress  He has no wheezes  He has no rales  He exhibits no tenderness  Abdominal: Soft  Bowel sounds are normal  He exhibits no distension and no mass  There is no tenderness  There is no rebound and no guarding  No hernia  Musculoskeletal: Normal range of motion  He exhibits no edema, tenderness or deformity  Equal bilateral radial/dp pulses- no ble edema/calf tendenrnss/asym/ erythema   Lymphadenopathy:     He has no cervical adenopathy  Neurological: He is alert  No cranial nerve deficit or sensory deficit  He exhibits normal muscle tone  Coordination normal    Skin: Skin is warm  Capillary refill takes less than 2 seconds  No rash noted  He is not diaphoretic  No erythema  No pallor  Psychiatric:   Flight of ideas- no coherent thought process-colorful affect    Nursing note and vitals reviewed  Vital Signs  ED Triage Vitals [11/22/19 1455]   Temperature Pulse Respirations Blood Pressure SpO2   97 5 °F (36 4 °C) 91 20 (!) 147/101 99 %      Temp Source Heart Rate Source Patient Position - Orthostatic VS BP Location FiO2 (%)   Oral -- -- -- --      Pain Score       No Pain           Vitals:    11/22/19 1455   BP: (!) 147/101   Pulse: 91         Visual Acuity      ED Medications  Medications - No data to display    Diagnostic Studies  Results Reviewed     Procedure Component Value Units Date/Time    Comprehensive metabolic panel [[id number]] Collected:  11/22/19 1669    Lab Status:   In process Specimen:  Blood from Arm, Right Updated:  11/22/19 1852    TSH, 3rd generation [[id number]] Collected:  11/22/19 1849    Lab Status: In process Specimen:  Blood from Arm, Right Updated:  11/22/19 1852    CBC and differential [[id number]] Collected:  11/22/19 1849    Lab Status: In process Specimen:  Blood from Arm, Right Updated:  11/22/19 1852    Rapid drug screen, urine [[id number]]  (Abnormal) Collected:  11/22/19 1826    Lab Status:  Final result Specimen:  Urine, Clean Catch Updated:  11/22/19 1843     Amph/Meth UR Positive     Barbiturate Ur Negative     Benzodiazepine Urine Negative     Cocaine Urine Negative     Methadone Urine Negative     Opiate Urine Negative     PCP Ur Negative     THC Urine Positive    Narrative:       Presumptive report  If requested, specimen will be sent to reference lab for confirmation  FOR MEDICAL PURPOSES ONLY  IF CONFIRMATION NEEDED PLEASE CONTACT THE LAB WITHIN 5 DAYS      Drug Screen Cutoff Levels:  AMPHETAMINE/METHAMPHETAMINES  1000 ng/mL  BARBITURATES     200 ng/mL  BENZODIAZEPINES     200 ng/mL  COCAINE      300 ng/mL  METHADONE      300 ng/mL  OPIATES      300 ng/mL  PHENCYCLIDINE     25 ng/mL  THC       50 ng/mL      POCT alcohol breath test [[id number]]  (Normal) Resulted:  11/22/19 1818    Lab Status:  Final result Updated:  11/22/19 1818     EXTBreath Alcohol 0 00                 No orders to display              Procedures  Procedures       ED Course  ED Course as of Nov 25 0826 Fri Nov 22, 2019   1609 - wayne bennett note- pt has called for a ride home- currently sleeping in nad       1728 Er md note- direct conversation with pt co worker- Orlando Del Toro- pt has been living in [employer] at work for last 2 weeks and acting very strangely - manicy as pr herself- which is new--  phone conversation with sister rebecca-  last month with this type of behavior- has no hx of mental illness in past-  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

## 2019-11-23 NOTE — PLAN OF CARE
Problem: NANCY  Goal: Will exhibit normal sleep and speech and no impulsivity  Description  INTERVENTIONS:  - Administer medication as ordered  - Set limits on impulsive behavior  - Make attempts to decrease external stimuli as possible  Outcome: Progressing     Problem: PSYCHOSIS  Goal: Will report no hallucinations or delusions  Description  Interventions:  - Administer medication as  ordered  - Every waking shifts and PRN assess for the presence of hallucinations and or delusions  - Assist with reality testing to support increasing orientation  - Assess if patient's hallucinations or delusions are encouraging self-harm or harm to others and intervene as appropriate  Outcome: Progressing     Problem: SUBSTANCE USE/ABUSE  Goal: Will have no detox symptoms and will verbalize plan for changing substance-related behavior  Description  INTERVENTIONS:  - Monitor physical status and assess for symptoms of withdrawal  - Administer medication as ordered  - Provide emotional support with 1 on 1 interaction with staff  - Encourage recovery focused program/ addiction education  - Assess for verbalization of changing behaviors related to substance abuse  - Initiate consults and referrals as appropriate (Case Management, Spiritual Care, etc )  Outcome: Progressing  Goal: By discharge, will develop insight into their chemical dependency and sustain motivation to continue in recovery  Description  INTERVENTIONS:  - Attends all daily group sessions and scheduled AA groups  - Actively practices coping skills through participation in the therapeutic community and adherence to program rules  - Reviews and completes assignments from individual treatment plan  - Assist patient development of understanding of their personal cycle of addiction and relapse triggers  Outcome: Progressing  Goal: By discharge, patient will have ongoing treatment plan addressing chemical dependency  Description  INTERVENTIONS:  - Assist patient with resources and/or appointments for ongoing recovery based living  Outcome: Progressing     Problem: INVOLUNTARY ADMIT  Goal: Will cooperate with staff recommendations and doctor's orders and will demonstrate appropriate behavior  Description  INTERVENTIONS:  - Treat underlying conditions and offer medication as ordered  - Educate regarding involuntary admission procedures and rules  - Utilize positive consistent limit setting strategies to support patient and staff safety  Outcome: Progressing

## 2019-11-23 NOTE — TREATMENT PLAN
TREATMENT PLAN REVIEW - Behavioral Health Tonio Schulzlouis 38 y o  1975 male MRN: [id number]    [address] [place] [address] Room / Bed: [place] [address]/[facility] Encounter: [id number]          Admit Date/Time:  11/23/2019  2:37 AM    Treatment Team: Attending Provider: Kaden Becerra MD; Consulting Physician: David Traore MD; Registered Nurse: Alverto Hudson RN; Patient Care Technician: Linda Tucker; Patient Care Technician: Margueritte Goltz; Patient Care Assistant: Alton Francois    Diagnosis: Principal Problem:    Psychosis [facility]  Active Problems:    Medical clearance for psychiatric admission    Attention deficit hyperactivity disorder (ADHD), combined type    Marijuana abuse      Patient Strengths/Assets: patient is on a voluntary commitment    Patient Barriers/Limitations: difficulty adapting, financial instability, homeless, lack of stable employment, resistance to treatment, substance abuse    Short Term Goals: decrease in psychotic symptoms    Long Term Goals: resolution of psychotic symptoms    Progress Towards Goals: starting psychiatric medications as prescribed    Recommended Treatment: medication management, patient medication education, group therapy, milieu therapy, continued Behavioral Health psychiatric evaluation/assessment process    Treatment Frequency: daily medication monitoring, group and milieu therapy daily, monitoring through interdisciplinary rounds, monitoring through weekly patient care conferences    Expected Discharge Date:   In 3 to 5 days     Discharge Plan: referrals as indicated    Treatment Plan Created/Updated By: Jorge L Hawley MD

## 2019-11-23 NOTE — ED NOTES
Patient is resting peacefully on his bed with the lights turned off and sign of distress either   Will continue to monitor patient until further notices      Jacob Haynes  11/22/19 2264

## 2019-11-23 NOTE — ED CARE HANDOFF
Emergency Department Sign Out Note        Sign out and transfer of care from Dr Harry Jimenez  See Separate Emergency Department note  The patient, Darshana Guzman, was evaluated by the previous provider for psychosis  Workup Completed:  yes    ED Course / Workup Pending (followup):  302 placement and patient accepted at [place] [address]  Procedures  MDM    Disposition  Final diagnoses:   Psychosis ([facility] [address] )     Time reflects when diagnosis was documented in both MDM as applicable and the Disposition within this note     Time User Action Codes Description Comment    11/23/2019 12:46 AM Alcario Reap Add [F30 10] Manic behavior ([facility] [address] )     11/23/2019  1:36 AM Osie Lace Add [F29] Psychosis [facility])       ED Disposition     ED Disposition Condition Date/Time Comment    Transfer to [facility] Nov 23, 2019  1:37 AM Darshana Guzman should be transferred out to [place] [address] and has been medically cleared          MD Documentation      Most Recent Value   Patient Condition  The patient has been stabilized such that within reasonable medical probability, no material deterioration of the patient condition or the condition of the unborn child(jamir) is likely to result from the transfer   Reason for Transfer  Level of Care needed not available at this facility   Benefits of Transfer  Specialized equipment and/or services available at the receiving facility (Include comment)________________________   Risks of Transfer  Potential for delay in receiving treatment   Accepting Physician  Dr Sameera Germain Name, marilin Garcia     (Name & Tel number)  Skip Larch by [place] and Unit #)  [place]   Sending MD Yudi Gilmore MD   Provider Certification  General risk, such as traffic hazards, adverse weather conditions, rough terrain or turbulence, possible failure of equipment (including vehicle or aircraft), or consequences of actions of persons outside the control of the transport personnel      RN Documentation      Most [address] Name, [place]     (Name & Tel number)  [facility]   Transported by (Company and Unit #)  [employer]      Follow-up Information    None       Patient's Medications    No medications on file     No discharge procedures on file         ED Provider  Electronically Signed by     Marilin Leggett MD  11/23/19 2750

## 2019-11-23 NOTE — ASSESSMENT & PLAN NOTE
Reviewed metabolic profile and EKG    Patient is medically cleared for inpatient behavioral health treatment

## 2019-11-23 NOTE — EMTALA/ACUTE CARE TRANSFER
[address] [place] [address]  Dept: [phone]      EMTALA TRANSFER CONSENT    NAME Autumn Llanes                                         1975                              MRN [id number]    I have been informed of my rights regarding examination, treatment, and transfer   by Dr Marilin Leggett MD    Benefits: Specialized equipment and/or services available at the receiving facility (Include comment)________________________    Risks: Potential for delay in receiving treatment      Consent for Transfer:  I acknowledge that my medical condition has been evaluated and explained to me by the emergency department physician or other qualified medical person and/or my attending physician, who has recommended that I be transferred to the service of  Accepting Physician: Dr Debra Sands  at [address] [place] [address] Name, [address] : [address] [place], [place]   The above potential benefits of such transfer, the potential risks associated with such transfer, and the probable risks of not being transferred have been explained to me, and I fully understand them  The doctor has explained that, in my case, the benefits of transfer outweigh the risks  I agree to be transferred  I authorize the performance of emergency medical procedures and treatments upon me in both transit and upon arrival at the receiving facility  Additionally, I authorize the release of any and all medical records to the receiving facility and request they be transported with me, if possible  I understand that the safest mode of transportation during a medical emergency is an ambulance and that the Hospital advocates the use of this mode of transport   Risks of traveling to the receiving facility by car, including absence of medical control, life sustaining equipment, such as oxygen, and medical personnel has been explained to me and I fully understand them     ([facility])  [ X ]  I consent to the stated transfer and to be transported by ambulance/helicopter  [  ]  I consent to the stated transfer, but refuse transportation by ambulance and accept full responsibility for my transportation by car  I understand the risks of non-ambulance transfers and I exonerate the Hospital and its staff from any deterioration in my condition that results from this refusal     X___________________________________________    DATE  19  TIME________  Signature of patient or legally responsible individual signing on patient behalf           RELATIONSHIP TO PATIENT_________________________          Provider Certification    NAME Charlene Kulkarni                                         1975                              MRN [id number]    A medical screening exam was performed on the above named patient  Based on the examination:    Condition Necessitating Transfer The primary encounter diagnosis was Manic behavior ([address] )  A diagnosis of Psychosis ([address] ) was also pertinent to this visit      Patient Condition: The patient has been stabilized such that within reasonable medical probability, no material deterioration of the patient condition or the condition of the unborn child(jamir) is likely to result from the transfer    Reason for Transfer: Level of Care needed not available at this facility    Transfer Requirements: [facility]    · Space available and qualified personnel available for treatment as acknowledged by [employer]  · Agreed to accept transfer and to provide appropriate medical treatment as acknowledged by       Dr Stephy William   · Appropriate medical records of the examination and treatment of the patient are provided at the time of transfer   [address] _______  · Transfer will be performed by qualified personnel from [employer]  and appropriate transfer equipment as required, including the use of necessary and appropriate life support measures  Provider Certification: I have examined the patient and explained the following risks and benefits of being transferred/refusing transfer to the patient/family:  General risk, such as traffic hazards, adverse weather conditions, rough terrain or turbulence, possible failure of equipment (including vehicle or aircraft), or consequences of actions of persons outside the control of the transport personnel      Based on these reasonable risks and benefits to the patient and/or the unborn child(jamir), and based upon the information available at the time of the patients examination, I certify that the medical benefits reasonably to be expected from the provision of appropriate medical treatments at another medical facility outweigh the increasing risks, if any, to the individuals medical condition, and in the case of labor to the unborn child, from effecting the transfer      X____________________________________________ DATE 11/23/19        TIME__0137_____  Lacie Silverio MD    ORIGINAL - SEND TO MEDICAL RECORDS   COPY - SEND WITH PATIENT DURING TRANSFER

## 2019-11-23 NOTE — PROGRESS NOTES
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  I was up at nights trying to figure out how to make it a productive company again so sleep was not so important for me  I became sleep deprived and so I am here to catch up on my sleep "  Pt stated he has been on  Adderall for the past 30 years due to his ADHD prescribed by his PCP ( Dr Yordy Becerra in [facility])   He uses THC regularly but denies any other illegal drug use  He is  and has 1 daughter  He denies any family history of mental illness  He stated he has never been in " a mental hospital like this before and he does not belong here "  He has multiple drawings in his possession that he stated " are solutions to life's problems   With these drawings I can see things in 3 D and 5 D but only people with my IQ can see it like that "  He is pleasant  Calm and cooperative upon interaction with staff and other patients  He is presently out in milieu interacting with other patients

## 2019-11-23 NOTE — CASE MANAGEMENT
Psychosocial Assessment 1:1    INFORMATION GATHERED BY SISTER AS SHE CALLED, NO INFORMATION PROVIDED AS PATIENT DID NOT SIGN RELEASE FOR HER YET  SISTER TO VISIT AT NIGHT HOURS AND WILL ATTEMPT TO HAVE HIM SIGN A RELEASE  Admission / Details: Patient is a 38year old male admitted to [address] [place] [address] on a 302 status after being brought into the emergency department by police  Patient presented to the police as bizarre and erratic in his car alone  Patient continued to present as argumentative, disorganized, tangential, homeless, preoccupied, responding to internal stimuli possibly, rambling, and rapid/repetitive speech  Patient admitted to have increased stressors at home and does not believe he needs to be in the hospital  CM approached patient however he was sleeping  Patient reported that he only slept 2-3 hours for the last month so CM did not wake patient  Patient has been reported to be calm and pleasant thus far  During admission patient however was making comments such as "I may look like a tow truck but you will all want my sperm because of how smart I am and will want to use it for IVR " and comments regarding President Bebeot Almaguer, and [facility]: [facility] Status: 302  Insurance: Self-pay  Marital Status: Patient is currently  for the last 10 years  Patient was  for 3-5 years,  2 of those years  Children: Patient has 1 daughter who is 15years old  Patient has been paying child support for her however has recently fell behind  Patient was supposed to go pick her up however ended up in the hospital   Family: Patient has family that lives in [place] including his parents however his two sisters live in the [place]  Residence: Patient lost his home in 2018 and has been staying in and out of hotels/motels  Patient is believed to have been staying at the [employer] in which he works for     Can return home: Sister is unsure what his plan will be following discharge  Lives with: Alone  Level of Ed: Patient completed high school in [place] [facility] [place] [facility] however never completed any schooling in the [place]  Patient came to the [place] at age 17  Work History: Patient worked at a [employer] for several years and 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  Patient has been in between work  Income/Source: Sister is unsure of his current income, if any  Sister reports that she has been loaning him money, recently 4,000$ for child support and other times to help him  Transportation: Patient owns his own vehicle which she has picked up so that it does not get towed  Legal Issues: Patient had court in [place] for child support that he did not pay  Patient believes he may have an arrest warrant for this  Pharmacy: Unable to obtain  MH Tx HX: Sister denies any previous inpatient issues or family history  Patient told her that he had ADHD for 30 years and has been on Adderall for this  Sister denies any famil history of MH  Trauma HX: Unable to obtain  D&A HX: Patient reports that he has been drinking more lately but normally does not  Unable to obtain  Patient reports that he uses THC daily, denies any other use  Access to firearms: None that the sister is aware of   UDS Results: Patient positive for THC and Meth/Amp     PCP: Dr Leopold Ferris: None  Therapist: None  ICM/ACT: None  Stressors: Unable to obtain  Strengths: Unable to obtain  Limitations: Unable to obtain  ROIS Signed: Unable to obtain  Treatment Plan Signed: Unable to obtain  IMM Signed: N/A  Upcoming Appointments: Unable to obtain

## 2019-11-24 LAB
ALBUMIN SERPL BCP-MCNC: 4.4 G/DL (ref 3–5.2)
ALP SERPL-CCNC: 64 U/L (ref 43–122)
ALT SERPL W P-5'-P-CCNC: 56 U/L (ref 9–52)
ANION GAP SERPL CALCULATED.3IONS-SCNC: 7 MMOL/L (ref 5–14)
AST SERPL W P-5'-P-CCNC: 29 U/L (ref 17–59)
BASOPHILS # BLD AUTO: 0 THOUSANDS/ΜL (ref 0–0.1)
BASOPHILS NFR BLD AUTO: 1 % (ref 0–1)
BILIRUB SERPL-MCNC: 1.2 MG/DL
BUN SERPL-MCNC: 11 MG/DL (ref 5–25)
CALCIUM SERPL-MCNC: 10 MG/DL (ref 8.4–10.2)
CHLORIDE SERPL-SCNC: 99 MMOL/L (ref 97–108)
CHOLEST SERPL-MCNC: 200 MG/DL
CO2 SERPL-SCNC: 32 MMOL/L (ref 22–30)
CREAT SERPL-MCNC: 0.96 MG/DL (ref 0.7–1.5)
EOSINOPHIL # BLD AUTO: 0.3 THOUSAND/ΜL (ref 0–0.4)
EOSINOPHIL NFR BLD AUTO: 7 % (ref 0–6)
ERYTHROCYTE [DISTWIDTH] IN BLOOD BY AUTOMATED COUNT: 13.4 %
GFR SERPL CREATININE-BSD FRML MDRD: 96 ML/MIN/1.73SQ M
GLUCOSE P FAST SERPL-MCNC: 88 MG/DL (ref 70–99)
GLUCOSE SERPL-MCNC: 88 MG/DL (ref 70–99)
HCT VFR BLD AUTO: 46.5 % (ref 41–53)
HDLC SERPL-MCNC: 41 MG/DL
HGB BLD-MCNC: 16.5 G/DL (ref 13.5–17.5)
LDLC SERPL CALC-MCNC: 140 MG/DL
LYMPHOCYTES # BLD AUTO: 2 THOUSANDS/ΜL (ref 0.5–4)
LYMPHOCYTES NFR BLD AUTO: 44 % (ref 25–45)
MCH RBC QN AUTO: 28.9 PG (ref 26–34)
MCHC RBC AUTO-ENTMCNC: 35.5 G/DL (ref 31–36)
MCV RBC AUTO: 81 FL (ref 80–100)
MONOCYTES # BLD AUTO: 0.3 THOUSAND/ΜL (ref 0.2–0.9)
MONOCYTES NFR BLD AUTO: 7 % (ref 1–10)
NEUTROPHILS # BLD AUTO: 1.9 THOUSANDS/ΜL (ref 1.8–7.8)
NEUTS SEG NFR BLD AUTO: 41 % (ref 45–65)
NONHDLC SERPL-MCNC: 159 MG/DL
PLATELET # BLD AUTO: 278 THOUSANDS/UL (ref 150–450)
PLATELET BLD QL SMEAR: ADEQUATE
PMV BLD AUTO: 8.1 FL (ref 8.9–12.7)
POTASSIUM SERPL-SCNC: 3.8 MMOL/L (ref 3.6–5)
PROT SERPL-MCNC: 7.7 G/DL (ref 5.9–8.4)
RBC # BLD AUTO: 5.71 MILLION/UL (ref 4.5–5.9)
RBC MORPH BLD: NORMAL
SODIUM SERPL-SCNC: 138 MMOL/L (ref 137–147)
TRIGL SERPL-MCNC: 96 MG/DL
TSH SERPL DL<=0.05 MIU/L-ACNC: 1.69 UIU/ML (ref 0.47–4.68)
WBC # BLD AUTO: 4.6 THOUSAND/UL (ref 4.5–11)

## 2019-11-24 PROCEDURE — 86592 SYPHILIS TEST NON-TREP QUAL: CPT | Performed by: PSYCHIATRY & NEUROLOGY

## 2019-11-24 PROCEDURE — 80053 COMPREHEN METABOLIC PANEL: CPT | Performed by: PSYCHIATRY & NEUROLOGY

## 2019-11-24 PROCEDURE — 84443 ASSAY THYROID STIM HORMONE: CPT | Performed by: PSYCHIATRY & NEUROLOGY

## 2019-11-24 PROCEDURE — 80061 LIPID PANEL: CPT | Performed by: PSYCHIATRY & NEUROLOGY

## 2019-11-24 PROCEDURE — 99232 SBSQ HOSP IP/OBS MODERATE 35: CPT | Performed by: NURSE PRACTITIONER

## 2019-11-24 PROCEDURE — 85025 COMPLETE CBC W/AUTO DIFF WBC: CPT | Performed by: PSYCHIATRY & NEUROLOGY

## 2019-11-24 RX ADMIN — OLANZAPINE 10 MG: 10 TABLET, ORALLY DISINTEGRATING ORAL at 21:22

## 2019-11-24 NOTE — PROGRESS NOTES
Pt visiting with sister in private room  Initially appeared angry at family, but agreed to meet and talk  Pt also signed JOYA for sister  Haldol, cogentin and ativan appear to have resulted in mild positive response from patient at this time

## 2019-11-24 NOTE — PROGRESS NOTES
Patient slept until after noon today, got up briefly to eat lunch and talk to his sister on the phone  Patient hung up on his sister and walked quickly back to his room, but when RN went to check on him he denied symptoms and needs  Said, "I think what I really needed was sleep  I've been sleeping for three days and now I feel even more normal " Patient then pulled the covers back up over him and turned over as if to return to sleep

## 2019-11-24 NOTE — PROGRESS NOTES
Progress Note - Behavioral Health   Ileen Hashimoto 38 y o  male MRN: [id number]  Unit/Bed#: [facility] 340-01 Encounter: [id number]    The patient was seen for continuing care and reviewed with treatment team   Staff reports no significant change in the past 24 hours  Patient was observed isolative to his room, lying in bed  Patient was dismissive upon approach and would not answer any questions asked  Patient looked up when I called his name then turned around and put his head back down and would not speak  Patient is compliant with medications  Mental Status Evaluation:  Appearance:  Adequate hygiene and grooming   Behavior:  Dismissive and Uncooperative   Mood:  Uncertain   Affect: Flat   Speech: Unable to assess due to patient factors   Thought Process:  Unable to assess due to scant verbalization   Thought Content:  Cannot be assessed due to patient factors   Perceptual Disturbances: Cannot be assessed due to patient factors   Risk Potential: Unable to assess due to patient factors   Attention/Concentration unable to assess due to pt factors   Orientation:   Unable to assess due to patient factors   Gait/Station: Not observed   Motor Activity: No abnormal movement noted     Progress Toward Goals:  No change    Assessment/Plan    Principal Problem:    Psychosis ([facility] )  Active Problems:    Medical clearance for psychiatric admission    Attention deficit hyperactivity disorder (ADHD), combined type    Marijuana abuse      Recommended Treatment: 1  Continue with pharmacotherapy, group therapy, milieu therapy and occupational therapy  2  Continue with safety checks per unit protocol  3  No medication changes at this time   The patient will be maintained on the following medications:    Current Facility-Administered Medications:  acetaminophen 650 mg Oral Q6H PRN Carlitos Chowdhury MD   aluminum-magnesium hydroxide-simethicone 30 mL Oral Q4H PRN Carlitos Chowdhury MD   benztropine 1 mg Intramuscular Q6H PRN Carlitos Chowdhury MD benztropine 1 mg Oral Q6H PRN Addison Alejo MD   haloperidol 5 mg Oral Q6H PRN Addison Alejo MD   haloperidol lactate 5 mg Intramuscular Q6H PRN Addison Alejo MD   LORazepam 1 mg Intramuscular Q6H PRN Addison Alejo MD   LORazepam 1 mg Oral Q6H PRN Addison Alejo MD   magnesium hydroxide 30 mL Oral Daily PRN Addison Alejo MD   OLANZapine 10 mg Oral HS Wale Geiger MD   OLANZapine 5 mg Intramuscular Q6H PRN [place], CRNP   OLANZapine 5 mg Oral Q6H PRN [place], CRNP   traZODone 50 mg Oral HS PRN Addison Alejo MD   ziprasidone 20 mg Oral Q6H PRN [place], CRNP   ziprasidone 10 mg Intramuscular Q6H PRN [place], CRNP       Risks, benefits and possible side effects of Medications:   Patient does not verbalize understanding at this time and will require further explanation  Titus Singh, SINAI    Portions of the record may have been created with voice recognition software  Occasional wrong word or "sound a like" substitutions may have occurred due to the inherent limitations of voice recognition software  Read the chart carefully and recognize, using context, where substitutions have occurred

## 2019-11-25 LAB — RPR SER QL: NORMAL

## 2019-11-25 PROCEDURE — 99232 SBSQ HOSP IP/OBS MODERATE 35: CPT | Performed by: PSYCHIATRY & NEUROLOGY

## 2019-11-25 RX ADMIN — OLANZAPINE 10 MG: 10 TABLET, ORALLY DISINTEGRATING ORAL at 21:21

## 2019-11-25 RX ADMIN — TRAZODONE HYDROCHLORIDE 50 MG: 50 TABLET ORAL at 23:36

## 2019-11-25 NOTE — PROGRESS NOTES
RN had extensive conversation with patient  Notable observations are increased insight into recent manic behavior, more focused and structured content, improved listening with staff  Pt appears invested in working on goals, is reading mental health book given to him by staff  States right now he has been thinking about his daughter and is concerned about if admission will affect custody  Vocalized regret that he let himself get out of control and states "I went through a period where I was obsessed with work and didn't sleep for many nights  I became manic and I just crashed after all that  I feel better after all that sleep  I really needed to sleep "     Pt apologized to nurse stating "I was a real asshole when I first came in  I also had no idea what I was talking about and for some reason thought you were against me  I see I didn't give you a chance  I was not myself " Compliant with meds  Denies SI, HI and hallucinations  Oriented X4

## 2019-11-25 NOTE — PLAN OF CARE
Problem: PSYCHOSIS  Goal: Will report no hallucinations or delusions  Description  Interventions:  - Administer medication as  ordered  - Every waking shifts and PRN assess for the presence of hallucinations and or delusions  - Assist with reality testing to support increasing orientation  - Assess if patient's hallucinations or delusions are encouraging self-harm or harm to others and intervene as appropriate  Outcome: Progressing     Problem: SUBSTANCE USE/ABUSE  Goal: Will have no detox symptoms and will verbalize plan for changing substance-related behavior  Description  INTERVENTIONS:  - Monitor physical status and assess for symptoms of withdrawal  - Administer medication as ordered  - Provide emotional support with 1 on 1 interaction with staff  - Encourage recovery focused program/ addiction education  - Assess for verbalization of changing behaviors related to substance abuse  - Initiate consults and referrals as appropriate (Case Management, Spiritual Care, etc )  Outcome: Progressing  Goal: By discharge, will develop insight into their chemical dependency and sustain motivation to continue in recovery  Description  INTERVENTIONS:  - Attends all daily group sessions and scheduled AA groups  - Actively practices coping skills through participation in the therapeutic community and adherence to program rules  - Reviews and completes assignments from individual treatment plan  - Assist patient development of understanding of their personal cycle of addiction and relapse triggers  Outcome: Progressing  Goal: By discharge, patient will have ongoing treatment plan addressing chemical dependency  Description  INTERVENTIONS:  - Assist patient with resources and/or appointments for ongoing recovery based living  Outcome: Progressing     Problem: INVOLUNTARY ADMIT  Goal: Will cooperate with staff recommendations and doctor's orders and will demonstrate appropriate behavior  Description  INTERVENTIONS:  - Treat underlying conditions and offer medication as ordered  - Educate regarding involuntary admission procedures and rules  - Utilize positive consistent limit setting strategies to support patient and staff safety  Outcome: Progressing     Problem: DISCHARGE PLANNING  Goal: Discharge to home or other facility with appropriate resources  Description  INTERVENTIONS:  - Identify barriers to discharge w/patient and caregiver  - Arrange for needed discharge resources and transportation as appropriate  - Identify discharge learning needs (meds, wound care, etc )  - Arrange for interpretive services to assist at discharge as needed  - Refer to Case Management Department for coordinating discharge planning if the patient needs post-hospital services based on physician/advanced practitioner order or complex needs related to functional status, cognitive ability, or social support system  Outcome: Progressing

## 2019-11-25 NOTE — CASE MANAGEMENT
Rec'd completed 303 petition from Dr Michelle Tompkins  Faxed 303 to [employer] @ [facility] crisis  FRANKO called petitioner, Dr Michael Wills, @ [place], [phone]  Spoke to  & advised of 303 hearing on 11/27 @ 8:30 AM  He told SW to call ER on that morning  Said he should be available by phone

## 2019-11-25 NOTE — PROGRESS NOTES
11/25/19 0700   Team Meeting   Meeting Type Daily Rounds   Team Members Present   Team Members Present Physician;Nurse;; Other (Discipline and Name)   Nursing Team Member [place] Work Team Member Marilyn Thomas   Other (Discipline and Name) [facility] Delgado   Patient/Family Present   Patient Present No   Patient's Family Present No     303 today  Med Management

## 2019-11-25 NOTE — PROGRESS NOTES
Observed client out in the day room sitting to self  Client is calm, polite, brightens on approach and willing to participate in the interview  Client sts that "I feel so much better today I really needed these days of sleep"  Client sts that he has not slept a decent nights sleep in 45 days may 2-3 hours at most per night  Client sts the reason was due to financial struggles and worries  Client denies SI, HI, A/V hallucinations  Client is cognitively clear and sts that the last few days felt surreal and describes the last few days as "sleeping with his eyes open  "Every dream was like I was awaken and couldn't differentiate my reality from the dream"  Client sts that he has memories of being rude and disruptive to staff members and wishes to apologize ,but to whom he isn't sure  Client was reassured and encouraged  Client requests to speak to MD regarding discharge/treatment plans

## 2019-11-25 NOTE — PROGRESS NOTES
11/25/19 1100   Activity/Group Checklist   Group   (recovery group)   Attendance Attended   Attendance Duration (min) 46-60   Interactions Interacted appropriately   Affect/Mood Appropriate   Goals Achieved Discussed self-esteem issues; Able to listen to others; Able to engage in interactions; Able to self-disclose; Able to recieve feedback; Able to give feedback to another   coping with the holidays

## 2019-11-25 NOTE — CASE MANAGEMENT
Pt admitted on 302 commitment  SW gave 303 petition to  to complete  SW called [facility], to schedule 303 hearing  Triny Desai said he had a copy of 302 petition   He scheduled 303 hearing for Wed, 11/27 @ 8:30 AM

## 2019-11-26 PROCEDURE — 99232 SBSQ HOSP IP/OBS MODERATE 35: CPT | Performed by: PSYCHIATRY & NEUROLOGY

## 2019-11-26 RX ADMIN — OLANZAPINE 15 MG: 10 TABLET, ORALLY DISINTEGRATING ORAL at 21:38

## 2019-11-26 RX ADMIN — TRAZODONE HYDROCHLORIDE 50 MG: 50 TABLET ORAL at 22:48

## 2019-11-26 RX ADMIN — LORAZEPAM 1 MG: 1 TABLET ORAL at 01:33

## 2019-11-26 NOTE — PROGRESS NOTES
Patient has been moving around the unit a lot this morning but was relatively calm in conversation  Said he didn't sleep quite as well last night, because he got a new roommate in the middle of the night, but that the medication here has been helping   Denied symptoms and needs and said he wanted to talk to the doctor about discharge because he's doing "so much better "

## 2019-11-26 NOTE — CASE MANAGEMENT
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

## 2019-11-26 NOTE — PROGRESS NOTES
11/26/19 0928   Team Meeting   Meeting Type Tx Team Meeting   Initial Conference Date 11/26/19   Next Conference Date 11/27/19   Team Members Present   Team Members Present Physician;Nurse;   Physician Team Member Dr DU   Nursing Team Member Eric Ruiz Work Team Member Ghulam   Patient/Family Present   Patient Present No   Patient's Family Present No   Unable to sign tx plan at this time

## 2019-11-26 NOTE — PROGRESS NOTES
Pt came to staff at the start of the shift complaining of not sleeping and received  Trazodone  Pt stated that the Trazodone was not effective and he was very agitated and could not sleep  He was given Ativan 1 mg

## 2019-11-26 NOTE — PROGRESS NOTES
Progress Note - Behavioral Health   Staci Wilhelm 38 y o  male MRN: @MRN   Unit/Bed#: [facility] 340-01 Encounter: [id number]      Report from staff regarding this patient received and discussed, and records reviewed prior to seeing this patient   The the patient level of psychosis is slowly improving, his no longer acutely manic as described in previous notes, however his delusional believes into his invention that will help him financially, his poor insight into his overall condition, remain the same  The 303 was submitted  Sleep: improved  Appetite: normal    Medication side effects:no complaints    Mental Status Evaluation:    Appearance:  casually dressed   Behavior:  calm   Mood:  anxious   Affect: brighter, labile    Speech:  increased rate, pressured, hypertalkative   Thought Process:  concrete, flight of ideas and illogical   Thought Content:  grandiose delusions   Perceptual Disturbances: denies auditory hallucinations when asked, does not appear responding to internal stimuli   Risk Potential: Suicidal ideation - None, contracts for safety on the unit  Homicidal ideation - None  Potential for aggression - No   Sensorium:  oriented to person, place and time   Memory:  recent and remote memory grossly intact   Consciousness:  alert and awake   Insight:  poor   Judgment: significantly impaired   Motor Activity: no abnormal movements         Laboratory results:  I have personally reviewed all pertinent laboratory results  BMP: No results for input(s): NA, K, CL, CO2, BUN in the last 72 hours      Invalid input(s): CREA, GLU  Vitals:    11/25/19 1601   BP: 129/89   Pulse: 70   Resp: 16   Temp: 98 3 °F (36 8 °C)   SpO2:         Assessment/Plan   Principal Problem:    Major depressive disorder, single episode, severe without psychotic features ([facility] )  Active Problems:    Intentional overdose of beta-adrenergic blocking drug (HCC)    Autism    Episodic cluster headache, not intractable    Menorrhagia with regular cycle    Iron deficiency anemia due to chronic blood loss      Recommended Treatment:   Will increase Zyprexa to 15 mg to further address the patient the delusional believe, with delusion of grandeur, unstable affect was improved but still present symptoms of psychomotor agitation  Planned medication and treatment changes: All current active medications have been reviewed  Continue treatment with group therapy, milieu therapy, occupational therapy and medication management  Risks / Benefits of Treatment:    Risks, benefits, and possible side effects of medications explained to patient  Patient has limited understanding of risks and benefits of treatment at this time, but agrees to take medications as prescribed  Planned medication and treatment changes: All current active medications have been reviewed  Continue treatment with group therapy, milieu therapy, occupational therapy and medication management  Counseling / Coordination of Care:    Patient's progress discussed with staff in treatment team meeting  Medication changes reviewed with staff in treatment team meeting

## 2019-11-26 NOTE — PROGRESS NOTES
Observed client out in the dayroom, positively interacting with other clients and staff  Client sts that he has a meeting with the MD tomorrow and wants to be discharged  Client is bright upon approach, calm and pleasant  Client denies SI, HI, A/V hallucinations, or other complaints  Client speaks of million dollar prospects upon discharge  "I'm gonna give good people money and have them give other good people money and somehow I will know and the world will be better"  Client re-directed in conversation  Will continue to monitor

## 2019-11-26 NOTE — CASE MANAGEMENT
Rec'd message from pt's sister, Andrew Reid, saying she was coming to see pt this afternoon & hoped to meet with SW  SW went to see pt  Was told that pt was mtg with his sister & brother-in-law  SW joined pt & family  Sister happy to talk to SW re: pt's status & D/C planning  Pt reported feeling much better now  Attributed his symptoms prior to admission to not sleeping  Said since he's been in hospital, he has been able to catch up on his sleep  Fauzia Reid this was his 1st psych admit  Pt hopeful to get back to work  SW advised of letter she wrote re: his missed court appearance  Copy of letter given to pt  Pt & sister asked about D/C  SW reviewed plan for 303 hearing tomorrow  Said the court would determine if pt needed more tx, & dr would determine when pt would be D/C  Sister hoped that pt would be D/C tomorrow, so pt could spend Thanksgiving with family  Sister said she'd pick pt up  SW asked pt where he'd stay after D/C  At first, sister said pt would stay with her for a few days  Then pt said he could either stay with a friend on [address] in \[place]\"", or at his [address] in \[place]\""  Said there was a room inside the warehouse  Said his car was at [facility]  SW expalined about follow-up tx by going for intake appt [facility] for funding for tx services  SW said she'd give pt a Good Rx card to help with cost of meds  Pt calm  Reported getting much out of groups he attended  Lucid  Focused  Denied all S/S (ie - SI, HI, voices, visions, depression, poor sleep or poor appetite)  Appropriate  Good eye contact  Engaged easily in conversation  Pt gave court letter to sister  She said she'd try to fax letter

## 2019-11-26 NOTE — PLAN OF CARE
Problem: PSYCHOSIS  Goal: Will report no hallucinations or delusions  Description  Interventions:  - Administer medication as  ordered  - Every waking shifts and PRN assess for the presence of hallucinations and or delusions  - Assist with reality testing to support increasing orientation  - Assess if patient's hallucinations or delusions are encouraging self-harm or harm to others and intervene as appropriate  Outcome: Progressing     Problem: SUBSTANCE USE/ABUSE  Goal: By discharge, patient will have ongoing treatment plan addressing chemical dependency  Description  INTERVENTIONS:  - Assist patient with resources and/or appointments for ongoing recovery based living  Outcome: Progressing     Problem: INVOLUNTARY ADMIT  Goal: Will cooperate with staff recommendations and doctor's orders and will demonstrate appropriate behavior  Description  INTERVENTIONS:  - Treat underlying conditions and offer medication as ordered  - Educate regarding involuntary admission procedures and rules  - Utilize positive consistent limit setting strategies to support patient and staff safety  Outcome: Progressing

## 2019-11-26 NOTE — CASE MANAGEMENT
FRANKO called [address] [place] [address] Support office, [phone]  Spoke to [address] re: letter confirming pt's hospitalization, as pt missed a court hearing on 11/22  She told SW to fax letter to fax # [phone]  She said the [facility] support office was in the probation office  FRANKO wrote letter  FRANKO faxed letter X 3  Each time the fax did not go thru  Called child support office  Was told to keep trying to fax letter, or send it via mail

## 2019-11-26 NOTE — PROGRESS NOTES
11/26/19 0800   Team Meeting   Meeting Type Daily Rounds   Team Members Present   Team Members Present Physician;Nurse;;; Other (Discipline and Name)   Nursing Team Member Carrie   Social Work Team Member Dinh Herrmann   Other (Discipline and Name) MARTHA Priest   Patient/Family Present   Patient Present No   Patient's Family Present No   303 hearing tomorrow morning at 830 a m

## 2019-11-27 VITALS
DIASTOLIC BLOOD PRESSURE: 80 MMHG | OXYGEN SATURATION: 97 % | WEIGHT: 185 LBS | TEMPERATURE: 98 F | HEIGHT: 69 IN | SYSTOLIC BLOOD PRESSURE: 134 MMHG | BODY MASS INDEX: 27.4 KG/M2 | RESPIRATION RATE: 16 BRPM | HEART RATE: 82 BPM

## 2019-11-27 LAB
ATRIAL RATE: 75 BPM
P AXIS: 33 DEGREES
PR INTERVAL: 160 MS
QRS AXIS: 19 DEGREES
QRSD INTERVAL: 90 MS
QT INTERVAL: 412 MS
QTC INTERVAL: 460 MS
T WAVE AXIS: 32 DEGREES
VENTRICULAR RATE: 75 BPM

## 2019-11-27 PROCEDURE — 99239 HOSP IP/OBS DSCHRG MGMT >30: CPT | Performed by: PSYCHIATRY & NEUROLOGY

## 2019-11-27 PROCEDURE — 93010 ELECTROCARDIOGRAM REPORT: CPT | Performed by: INTERNAL MEDICINE

## 2019-11-27 RX ORDER — OLANZAPINE 15 MG/1
15 TABLET ORAL
Qty: 30 TABLET | Refills: 1 | Status: SHIPPED | OUTPATIENT
Start: 2019-11-27 | End: 2019-12-31 | Stop reason: HOSPADM

## 2019-11-27 RX ADMIN — LORAZEPAM 1 MG: 1 TABLET ORAL at 01:56

## 2019-11-27 NOTE — PLAN OF CARE
Problem: PSYCHOSIS  Goal: Will report no hallucinations or delusions  Description  Interventions:  - Administer medication as  ordered  - Every waking shifts and PRN assess for the presence of hallucinations and or delusions  - Assist with reality testing to support increasing orientation  - Assess if patient's hallucinations or delusions are encouraging self-harm or harm to others and intervene as appropriate  Outcome: Progressing     Problem: SUBSTANCE USE/ABUSE  Goal: Will have no detox symptoms and will verbalize plan for changing substance-related behavior  Description  INTERVENTIONS:  - Monitor physical status and assess for symptoms of withdrawal  - Administer medication as ordered  - Provide emotional support with 1 on 1 interaction with staff  - Encourage recovery focused program/ addiction education  - Assess for verbalization of changing behaviors related to substance abuse  - Initiate consults and referrals as appropriate (Case Management, Spiritual Care, etc )  Outcome: Progressing  Goal: By discharge, will develop insight into their chemical dependency and sustain motivation to continue in recovery  Description  INTERVENTIONS:  - Attends all daily group sessions and scheduled AA groups  - Actively practices coping skills through participation in the therapeutic community and adherence to program rules  - Reviews and completes assignments from individual treatment plan  - Assist patient development of understanding of their personal cycle of addiction and relapse triggers  Outcome: Progressing  Goal: By discharge, patient will have ongoing treatment plan addressing chemical dependency  Description  INTERVENTIONS:  - Assist patient with resources and/or appointments for ongoing recovery based living  Outcome: Progressing     Problem: INVOLUNTARY ADMIT  Goal: Will cooperate with staff recommendations and doctor's orders and will demonstrate appropriate behavior  Description  INTERVENTIONS:  - Treat underlying conditions and offer medication as ordered  - Educate regarding involuntary admission procedures and rules  - Utilize positive consistent limit setting strategies to support patient and staff safety  Outcome: Progressing     Problem: DISCHARGE PLANNING  Goal: Discharge to home or other facility with appropriate resources  Description  INTERVENTIONS:  - Identify barriers to discharge w/patient and caregiver  - Arrange for needed discharge resources and transportation as appropriate  - Identify discharge learning needs (meds, wound care, etc )  - Arrange for interpretive services to assist at discharge as needed  - Refer to Case Management Department for coordinating discharge planning if the patient needs post-hospital services based on physician/advanced practitioner order or complex needs related to functional status, cognitive ability, or social support system  Outcome: Progressing     Problem: Ineffective Coping  Goal: Participates in unit activities  Description  Interventions:  - Provide therapeutic environment   - Provide required programming   - Redirect inappropriate behaviors   Outcome: Progressing

## 2019-11-27 NOTE — PROGRESS NOTES
Progress Note - Behavioral Health   Mickey Diana 38 y o  male MRN: @MRN   Unit/Bed#: Errol Mckeon 340-01 Encounter: [id number]            Report from staff regarding this patient received and discussed, and records reviewed prior to seeing this patient   Patient's condition brighten up  He no longer was manic and delusional, was more reality based  The continue to take his medications as prescribed  His sleep improving  Sleep: improved  Appetite: normal    Medication side effects:no complaints    Mental Status Evaluation:    Appearance:  dressed appropriately, casually dressed   Behavior:  pleasant, cooperative, calm   Mood:  improved, anxious   Affect: appropriate, reactive, brighter    Speech:  increased rate, hypertalkative   Thought Process:  concrete   Thought Content:  obsessions, intrusive thoughts   Perceptual Disturbances: denies auditory hallucinations when asked, does not appear responding to internal stimuli   Risk Potential: Suicidal ideation - None, contracts for safety on the unit  Homicidal ideation - None  Potential for aggression - No   Sensorium:  oriented to person, place and time   Memory:  recent and remote memory grossly intact   Consciousness:  alert and awake   Insight:  improving   Judgment: improving   Motor Activity: no abnormal movements         Laboratory results:  I have personally reviewed all pertinent laboratory results      BMP:   Recent Labs     11/24/19  0626   K 3 8   CL 99   CO2 32*   BUN 11     Vitals:    11/26/19 1522   BP: 120/76   Pulse: 94   Resp: 16   Temp: 97 7 °F (36 5 °C)   SpO2:         Assessment/Plan   Principal Problem:    Psychosis (HCC)  Active Problems:    Medical clearance for psychiatric admission    Attention deficit hyperactivity disorder (ADHD), combined type    Marijuana abuse      Recommended Treatment:   Will increase Zyprexa to 15 mg today to provide the patient was for signs of improvement the reasonable anti psychotic the dose to make sure that the patient improvement is stable and  Persistent  The patient tolerates the medication well without any problems  Planned medication and treatment changes: All current active medications have been reviewed  Continue treatment with group therapy, milieu therapy, occupational therapy and medication management  Risks / Benefits of Treatment:    Risks, benefits, and possible side effects of medications explained to patient and patient verbalizes understanding and agreement for treatment  Planned medication and treatment changes: All current active medications have been reviewed  Continue treatment with group therapy, milieu therapy, occupational therapy and medication management  Counseling / Coordination of Care:    Patient's progress discussed with staff in treatment team meeting  Medication changes reviewed with staff in treatment team meeting  ** Please Note: This note has been constructed using a voice recognition system   **      ----------------------------------------------------------------------------------------------------------------

## 2019-11-27 NOTE — PROGRESS NOTES
Pt denies SI/HI  Pt did not exhibit any paranoid or delusional material   He is social and visible in milieu  Pt is happy about discharge today

## 2019-11-27 NOTE — DISCHARGE SUMMARY
Discharge Summary - [facility] 38 y o  male MRN: [id number]  Unit/Bed#: Sharonda Bishop Encounter: [id number]     Admission Date: 11/23/2019         Discharge Date:       Attending Psychiatrist: HUY Hernandez     Reason for Admission/HPI:     Pt was admitted in an acute psychotic manic state  He was approached by police while residing in his car, and was reported as having bizzare interaction with police, with clear symptoms of ricardo, described on admission  Pt did not sleep several nights that might be a contributing factor to his psychomotor agitation with poor insight  Another factor potentially contributing to his manic and grandiose state of mind was the use of stimulants  Hospital Course:       Mr Karrie Rey was admitted based on [id number] petition and all appropriate precautions were started  Pt was oriented to the unit  His treatment, including medication management and therapy was discussed with the patient, and  he agreed  Risks, benefits, and possible side effects of medications explained to patient  Patient has limited understanding of risks and benefits of treatment at this time, but agrees to take medications as prescribed  During the hospitalization the patient was encouraged to attend individual therapy, group therapy, milieu therapy and occupational therapy  Patient condition significantly improved after his  medications were started  Pt state was initially manic and delusional, grandiose, consistent with his diagnosis of manic episode  Pt was complained with medication, Zyprexa that was selected to treat manic psychotic disorder as FDA approved medication  ODT Zyprexa was selected to help with adherence with medication management  Pt was manic and delusional at fist, but his mental state improved as a result of provided treatment  Was no longer grandiose, his thoughts became reality based  Pt's relatives visited [address] [place] [address] and found his condition improved   Court hearing was held on a day of discharge and outpatient treatment was ordered  I discussed the medication regimen and possible side effects of the medications with the patient prior to discharge  At the time of discharge Mr Shine Pepe was tolerating Zyprexa well and did not have any side effects after the dose was increased to 15 mg  Please see After Discharge Summary for a completed list of discharge medications  Safety plan was discussed and approved by the patient  He was not manic, depressed, angry, or confused or psychotic on a day of discharge and was accountable for his actions  I discussed outpatient follow up with the patient, was arranged by the unit  upon discharge  Safety plan was discussed and approved by Lavelle Regalado  Reviewed with the patient importance of compliance with medications and outpatient treatment after discharge  The patient was competent to understand of risks and benefits of his actions           Mental Status at time of Discharge:     Mental Status Evaluation:    Appearance:  dressed appropriately, casually dressed   Behavior:  cooperative, calm   Mood:  improved, anxious   Affect: normal range and intensity, appropriate, reactive    Speech:  normal rate and volume, normal pitch   Language: appropriate   Thought Process:  concrete   Associations: intact associations   Thought Content:  normal   Perceptual Disturbances: denies auditory hallucinations when asked, does not appear responding to internal stimuli   Risk Potential: Suicidal ideation - None  Homicidal ideation - None  Potential for aggression - No   Sensorium:  oriented to person, place and time   Memory:  recent and remote memory grossly intact   Consciousness:  alert and awake   Attention: attention span and concentration are normal   Intellect: normal   Fund of Knowledge: awareness of current events appropriate   Insight:  improved   Judgment: normal   Muscle Tone: normal   Gait/Station: normal gait/station and normal balance   Motor Activity: no abnormal movements         Discharge Diagnosis:   Patient Active Problem List   Diagnosis    Medical clearance for psychiatric admission    Attention deficit hyperactivity disorder (ADHD), combined type    Marijuana abuse    Psychosis ([facility] [address] )       Discharge Medications: Zyprexa 15 mg hs  See after visit summary for reconciled discharge medications provided to patient and family  Discharge instructions/Information to patient and family:   See after visit summary for information provided to patient and family  Provisions for Follow-Up Care:  See after visit summary for information related to follow-up care and any pertinent home health orders  Discharge Statement   I spent 35 minutes discharging the patient  This time was spent on the day of discharge  I had direct contact with the patient on the day of discharge  Additional documentation is required if more than 30 minutes were spent on discharge  Portions of the record may have been created with voice recognition software

## 2019-11-27 NOTE — PROGRESS NOTES
11/26/19 0800   Team Meeting   Meeting Type Daily Rounds   Team Members Present   Team Members Present Physician;Nurse;;; Other (Discipline and Name)   Physician Team Member [facility]   Nursing Team Member Jocelynn Kiran   Social Work Team Member Lilia   Other (Discipline and Name) MARTHA ag, Resident [employer] and Medical Student Raymon   Patient/Family Present   Patient Present No   Patient's Family Present No     Discharge today

## 2019-11-27 NOTE — PROGRESS NOTES
Patient awoke and stated that he was feeling anxious and unable to sleep  He had taken his PRN of Trazodone which was not effective and requested something for his anxiety  Patient was offered his PRN of ativan to assist with his anxiety around 0156  PRN was effective

## 2019-11-27 NOTE — CASE MANAGEMENT
Met with pt prior to 303 hearing  Pt somewhat nervous re: hearing  Hopeful for D/C  Denied all S/S prior to admission  303 hearing held  Pt to be D/C on 303 commitment for out pt order for 20 days  Pt happy with outcome  Rec'd call from [address] [place], pt's nurse, saying that pt's sister already called  She will pick pt up  Good Rx card placed into pt's blue D/C folder  Pt will follow-up @ [facility] on 12/2 @ 9 AM for walk-in intake & on 12/2 @ 1 PM @ [facility] for walk-in assessment appt

## 2019-11-27 NOTE — PLAN OF CARE
Problem: PSYCHOSIS  Goal: Will report no hallucinations or delusions  Description  Interventions:  - Administer medication as  ordered  - Every waking shifts and PRN assess for the presence of hallucinations and or delusions  - Assist with reality testing to support increasing orientation  - Assess if patient's hallucinations or delusions are encouraging self-harm or harm to others and intervene as appropriate  11/27/2019 0911 by Emerita Diop RN  Outcome: Completed  11/27/2019 0726 by Emerita Diop RN  Outcome: Progressing     Problem: SUBSTANCE USE/ABUSE  Goal: Will have no detox symptoms and will verbalize plan for changing substance-related behavior  Description  INTERVENTIONS:  - Monitor physical status and assess for symptoms of withdrawal  - Administer medication as ordered  - Provide emotional support with 1 on 1 interaction with staff  - Encourage recovery focused program/ addiction education  - Assess for verbalization of changing behaviors related to substance abuse  - Initiate consults and referrals as appropriate (Case Management, Spiritual Care, etc )  11/27/2019 0911 by Emerita Diop RN  Outcome: Completed  11/27/2019 0726 by Emerita Diop RN  Outcome: Progressing  Goal: By discharge, will develop insight into their chemical dependency and sustain motivation to continue in recovery  Description  INTERVENTIONS:  - Attends all daily group sessions and scheduled AA groups  - Actively practices coping skills through participation in the therapeutic community and adherence to program rules  - Reviews and completes assignments from individual treatment plan  - Assist patient development of understanding of their personal cycle of addiction and relapse triggers  11/27/2019 0911 by Emerita Diop RN  Outcome: Completed  11/27/2019 0726 by Emerita Diop RN  Outcome: Progressing  Goal: By discharge, patient will have ongoing treatment plan addressing chemical dependency  Description  INTERVENTIONS:  - Assist patient with resources and/or appointments for ongoing recovery based living  11/27/2019 0911 by Dilia Sotelo RN  Outcome: Completed  11/27/2019 0726 by Dilia Sotelo RN  Outcome: Progressing     Problem: INVOLUNTARY ADMIT  Goal: Will cooperate with staff recommendations and doctor's orders and will demonstrate appropriate behavior  Description  INTERVENTIONS:  - Treat underlying conditions and offer medication as ordered  - Educate regarding involuntary admission procedures and rules  - Utilize positive consistent limit setting strategies to support patient and staff safety  11/27/2019 0911 by Dilia Sotelo RN  Outcome: Completed  11/27/2019 0726 by Dilia Sotelo RN  Outcome: Progressing     Problem: DISCHARGE PLANNING  Goal: Discharge to home or other facility with appropriate resources  Description  INTERVENTIONS:  - Identify barriers to discharge w/patient and caregiver  - Arrange for needed discharge resources and transportation as appropriate  - Identify discharge learning needs (meds, wound care, etc )  - Arrange for interpretive services to assist at discharge as needed  - Refer to Case Management Department for coordinating discharge planning if the patient needs post-hospital services based on physician/advanced practitioner order or complex needs related to functional status, cognitive ability, or social support system  11/27/2019 0911 by Dilia Sotelo RN  Outcome: Completed  11/27/2019 0726 by Dilia Sotelo RN  Outcome: Progressing     Problem: Ineffective Coping  Goal: Participates in unit activities  Description  Interventions:  - Provide therapeutic environment   - Provide required programming   - Redirect inappropriate behaviors   11/27/2019 0911 by Dilia Sotelo RN  Outcome: Completed  11/27/2019 0726 by Dilia Sotelo RN  Outcome: Progressing

## 2019-12-22 ENCOUNTER — HOSPITAL ENCOUNTER (EMERGENCY)
Facility: HOSPITAL | Age: 44
End: 2019-12-23
Attending: EMERGENCY MEDICINE

## 2019-12-22 DIAGNOSIS — Z00.8 MEDICAL CLEARANCE FOR PSYCHIATRIC ADMISSION: ICD-10-CM

## 2019-12-22 DIAGNOSIS — F30.9 MANIA (HCC): Primary | ICD-10-CM

## 2019-12-22 DIAGNOSIS — F19.10 SUBSTANCE ABUSE (HCC): ICD-10-CM

## 2019-12-22 DIAGNOSIS — F31.9 BIPOLAR DISORDER (HCC): ICD-10-CM

## 2019-12-22 LAB
AMPHETAMINES SERPL QL SCN: POSITIVE
BARBITURATES UR QL: NEGATIVE
BENZODIAZ UR QL: NEGATIVE
COCAINE UR QL: NEGATIVE
ETHANOL EXG-MCNC: 0 MG/DL
METHADONE UR QL: NEGATIVE
OPIATES UR QL SCN: NEGATIVE
PCP UR QL: NEGATIVE
THC UR QL: POSITIVE

## 2019-12-22 PROCEDURE — 99285 EMERGENCY DEPT VISIT HI MDM: CPT

## 2019-12-22 PROCEDURE — 80307 DRUG TEST PRSMV CHEM ANLYZR: CPT | Performed by: EMERGENCY MEDICINE

## 2019-12-22 PROCEDURE — 99281 EMR DPT VST MAYX REQ PHY/QHP: CPT | Performed by: EMERGENCY MEDICINE

## 2019-12-22 PROCEDURE — 82075 ASSAY OF BREATH ETHANOL: CPT | Performed by: EMERGENCY MEDICINE

## 2019-12-22 PROCEDURE — NC001 PR NO CHARGE: Performed by: EMERGENCY MEDICINE

## 2019-12-22 RX ORDER — TRAZODONE HYDROCHLORIDE 100 MG/1
50 TABLET ORAL
Status: CANCELLED | OUTPATIENT
Start: 2019-12-22

## 2019-12-22 RX ORDER — IBUPROFEN 400 MG/1
400 TABLET ORAL EVERY 8 HOURS PRN
Status: DISCONTINUED | OUTPATIENT
Start: 2019-12-22 | End: 2019-12-22

## 2019-12-22 RX ORDER — LORAZEPAM 0.5 MG/1
1 TABLET ORAL EVERY 6 HOURS PRN
Status: CANCELLED | OUTPATIENT
Start: 2019-12-22

## 2019-12-22 RX ORDER — BENZTROPINE MESYLATE 0.5 MG/1
0.5 TABLET ORAL EVERY 6 HOURS PRN
Status: CANCELLED | OUTPATIENT
Start: 2019-12-22

## 2019-12-22 RX ORDER — MAGNESIUM HYDROXIDE/ALUMINUM HYDROXICE/SIMETHICONE 120; 1200; 1200 MG/30ML; MG/30ML; MG/30ML
20 SUSPENSION ORAL EVERY 4 HOURS PRN
Status: CANCELLED | OUTPATIENT
Start: 2019-12-22

## 2019-12-22 RX ORDER — HYDROXYZINE HYDROCHLORIDE 25 MG/1
25 TABLET, FILM COATED ORAL EVERY 6 HOURS PRN
Status: CANCELLED | OUTPATIENT
Start: 2019-12-22

## 2019-12-22 RX ORDER — IBUPROFEN 400 MG/1
400 TABLET ORAL EVERY 8 HOURS PRN
Status: DISCONTINUED | OUTPATIENT
Start: 2019-12-22 | End: 2019-12-23 | Stop reason: HOSPADM

## 2019-12-22 RX ORDER — OLANZAPINE 5 MG/1
5 TABLET, ORALLY DISINTEGRATING ORAL EVERY 8 HOURS PRN
Status: DISCONTINUED | OUTPATIENT
Start: 2019-12-22 | End: 2019-12-23 | Stop reason: HOSPADM

## 2019-12-22 RX ORDER — IBUPROFEN 400 MG/1
400 TABLET ORAL EVERY 8 HOURS PRN
Status: CANCELLED | OUTPATIENT
Start: 2019-12-22

## 2019-12-22 RX ORDER — BENZTROPINE MESYLATE 1 MG/ML
1 INJECTION INTRAMUSCULAR; INTRAVENOUS EVERY 6 HOURS PRN
Status: CANCELLED | OUTPATIENT
Start: 2019-12-22

## 2019-12-22 RX ORDER — ACETAMINOPHEN 325 MG/1
650 TABLET ORAL EVERY 8 HOURS PRN
Status: DISCONTINUED | OUTPATIENT
Start: 2019-12-22 | End: 2019-12-23 | Stop reason: HOSPADM

## 2019-12-22 RX ORDER — HALOPERIDOL 5 MG
5 TABLET ORAL EVERY 8 HOURS PRN
Status: CANCELLED | OUTPATIENT
Start: 2019-12-22

## 2019-12-22 RX ORDER — RISPERIDONE 1 MG/1
1 TABLET, ORALLY DISINTEGRATING ORAL 2 TIMES DAILY PRN
Status: CANCELLED | OUTPATIENT
Start: 2019-12-22

## 2019-12-22 RX ORDER — LORAZEPAM 2 MG/ML
2 INJECTION INTRAMUSCULAR EVERY 6 HOURS PRN
Status: CANCELLED | OUTPATIENT
Start: 2019-12-22

## 2019-12-22 RX ORDER — IBUPROFEN 600 MG/1
600 TABLET ORAL EVERY 8 HOURS PRN
Status: CANCELLED | OUTPATIENT
Start: 2019-12-22

## 2019-12-22 RX ORDER — IBUPROFEN 400 MG/1
800 TABLET ORAL EVERY 8 HOURS PRN
Status: CANCELLED | OUTPATIENT
Start: 2019-12-22

## 2019-12-22 NOTE — ED NOTES
Patient is rambling and has flight of ideas  Patient is insistent on cleaning the rest room  This RN redirected patient after 30mins of him cleaning the bathroom  Patient is now resting in bed        [facility]  12/22/19 5813

## 2019-12-22 NOTE — LETTER
[facility] EMERGENCY DEPARTMENT  [address] [facility] [address]  [phone]  Dept: [phone]      EMTALA TRANSFER CONSENT    NAME Autumn Llanes                                        KEYLA 1975                              MRN [id number]    I have been informed of my rights regarding examination, treatment, and transfer   by Karyle Rolling, DO  Benefits: Continuity of care    Risks: Potential for delay in receiving treatment      { ED EMTALA TRANSFER CHOICES:[id number]}    I authorize the performance of emergency medical procedures and treatments upon me in both transit and upon arrival at the receiving facility  Additionally, I authorize the release of any and all medical records to the receiving facility and request they be transported with me, if possible  I understand that the safest mode of transportation during a medical emergency is an ambulance and that the Hospital advocates the use of this mode of transport  Risks of traveling to the receiving facility by car, including absence of medical control, life sustaining equipment, such as oxygen, and medical personnel has been explained to me and I fully understand them  (CHUY CORRECT BOX BELOW)  [x  ]  I consent to the stated transfer and to be transported by ambulance/helicopter  [  ]  I consent to the stated transfer, but refuse transportation by ambulance and accept full responsibility for my transportation by car    I understand the risks of non-ambulance transfers and I exonerate the Hospital and its staff from any deterioration in my condition that results from this refusal     X__Patient is unable to sign due to mental status__  DATE  19  TIME________  Signature of patient or legally responsible individual signing on patient behalf           RELATIONSHIP TO PATIENT_________________________          Provider Certification    NAME Autumn Llanes                                        KEYLA 1975 MRN [id number]    A medical screening exam was performed on the above named patient  Based on the examination: The patient has been medically cleared for transfer to inpatient psychiatry  Condition Necessitating Transfer The primary encounter diagnosis was R[place] ([address] )  Diagnoses of Bipolar disorder ([address] ), Substance abuse ([facility] [address] ), Psychosis and Medical clearance for psychiatric admission were also pertinent to this visit  Patient Condition: The patient has been stabilized such that within reasonable medical probability, no material deterioration of the patient condition or the condition of the unborn child(jamir) is likely to result from the transfer    Reason for Transfer: No bed available at level of patient's needs    Transfer Requirements: [address] [place] [address], [facility]  · Space available and qualified personnel available for treatment as acknowledged by Dasha Geiger [phone]  · Agreed to accept transfer and to provide appropriate medical treatment as acknowledged by       Ana Maria Han MD  · Appropriate medical records of the examination and treatment of the patient are provided at the time of transfer   [address] [place] [address] [address] _______  · Transfer will be performed by qualified personnel from [facility] ([facility] [phone]  and appropriate transfer equipment as required, including the use of necessary and appropriate life support measures      Provider Certification: I have examined the patient and explained the following risks and benefits of being transferred/refusing transfer to the patient/family:  General risk, such as traffic hazards, adverse weather conditions, rough terrain or turbulence, possible failure of equipment (including vehicle or aircraft), or consequences of actions of persons outside the control of the transport personnel      Based on these reasonable risks and benefits to the patient and/or the unborn child(jamir), and based upon the information available at the time of the patients examination, I certify that the medical benefits reasonably to be expected from the provision of appropriate medical treatments at another medical facility outweigh the increasing risks, if any, to the individuals medical condition, and in the case of labor to the unborn child, from effecting the transfer      X____________________________________________ DATE 12/22/19        TIME_______      ORIGINAL - SEND TO MEDICAL RECORDS   COPY - SEND WITH PATIENT DURING TRANSFER

## 2019-12-22 NOTE — ED NOTES
Patient is accepted at [facility]  Patient is accepted by Sherman Gonzalez MD      Transportation is arranged with [facility]  Transportation is scheduled for 12/23/19 at 08:00  Nurse report is to be called to [phone] prior to patient transfer

## 2019-12-22 NOTE — ED NOTES
Asael Strong from [facility] ([facility]) AT [place] confirmed receipt of 302

## 2019-12-22 NOTE — ED NOTES
Attempted a 201 as the patient stated he was willing to sign in voluntarily; however, he was unable to be clear regarding 72 hour notice, added lines to the 201 that he believed were left out of the document that pertained to being tricked into buying a house  He stated he had to clarify that he was him and not a parent  He also insisted the 201 was his 72 hour notive  He repeatedly closed one eye and then the other due to his belief that what was on the paper changed depending upon which eye he was using and upon which way he tilted the clipboard  Ultimately, he was unable to actually sign; therefore, a 302 was initiated by the ED attending  Call from Lisette Mckeon, patient's employee and friend asking about disposition  She added the patient has been cleaning bathroom fixtures including the sink and toilet with a toothbrush  In the ED, he pointed out that he cleaned the toilet; however, he became fixated on the paper towel and toilet paper dispensers and the logos on the changing depending on which eye he was looking at them with

## 2019-12-22 NOTE — ED NOTES
The patient, who goes by "Dannie Stevens", was brought to the ED by his friend and employee  He was a poor historian and went to sleep almost immedicately upon arrival  He directed staff to "ask Giorgio Cohn" the woman who brought him in  The patient has a history of Bipolar I Disorder and was admitted 11/2019 for a true manic episode  Giorgio Cohn reported the patient has not taken the medication prescribed during that admission  She stated she also believes he may be taking too much of his [address] reportedly stated, "Don't worry  I'm really good with drugs " She stated he has been awake for days in a row and has been spending hours on doodles that he believes is his invention of a vehicle that can provide 200 miles to the gallon  HE has been shining multiple types of lights at the doodle and spent about 12 hours on it yesterday  HE has been grinding the gears of his own vehilcle from 1st to 2nd, stating once he gets it going 2 miles faster, 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 and becoome iris balckmon  The patient reportedly withdrew $9000 from his business account and purchased $800 worth of [employer] gift cards  He then gave them to an employee there and told her to give them out to customers  Giorgio Cohn stated that word spread and people were piling into [employer] by the carful  She tried to retrive some of the money but was told it was already used on the customers  She cannot account for the remaining $8000  She stated everyone is concerned about his welfare, including his family, friends and employees  She offered to petition if the patient will not volutarily sign in as he has begun to talk about hearing God  His insight has been poor and his judgment is impaired  He has been quite impulsive at times  Nelda Laverne  "Everyone is concerned he will (inadvertently) hurt himself or someone else "

## 2019-12-22 NOTE — LETTER
Section I - General Information    Name of Patient: Phil White                 : 1975    Medicare #:____________________  Transport Date: 19 (PCS is valid for round trips on this date and for all repetitive trips in the 60-day range as noted below )  Origin: [address] [place] [address]                                                         Destination:_[facility], [facility] [address]___________  Is the pt's stay covered under Medicare Part A (PPS/DRG)     (_) YES  (_x) NO  Closest appropriate facility? (x_) YES  (_) NO  If no, why is transport to more distant facility required?________________________  If hosp-hosp transfer, describe services needed at 2nd facility not available at 1st facility? _________________________________  If hospice pt, is this transport related to pt's terminal illness? (_) YES (_) NO Describe____________________________________    Section II - Medical Necessity Questionnaire  Ambulance transportation is medically necessary only if other means of transport are contraindicated or would be potentially harmful to the patient  To meet this requirement, the patient must either be "bed confined" or suffer from a condition such that transport by means other than ambulance is contraindicated by the patient's condition   The following questions must be answered by the medical professional signing below for this form to be valid:    1)  Describe the MEDICAL CONDITION (physical and/or mental) of this patient AT [address] [place] [address] that requires the patient to be transported in an ambulance and why transport by other means is contraindicated by the patient's condition:_Bipolar Disorder, Manic Episode with Psychosis_________________________________    2) Is the patient "bed confined" as defined below?     (_) YES  (_x) NO  To be "be confined" the patient must satisfy all three of the following conditions: (1) unable to get up from bed without Assistance; AND (2) unable to ambulate; AND (3) unable to sit in a chair or wheelchair  3) Can this patient safely be transported by car or wheelchair Daren Miranda (i e , seated during transport without a medical attendant or monitoring)?   (_) YES  (_x) NO    4) In addition to completing questions 1-3 above, please check any of the following conditions that apply*:  *Note: supporting documentation for any boxes checked must be maintained in the patient's medical records  (_)Contractures   (_)Non-Healed Fractures  (_)Patient is confused (_)Patient is comatose (_)Moderate/severe pain on movement (x_)Danger to self/others  (_)IV meds/fluids required (_)Patient is combative(_)Need or possible need for restraints (_)DVT requires elevation of lower extremity  (x_)Medical attendant required (_)Requires oxygen-unable to self administer (_)Special handling/isolation/infection control precautions required (_)Unable to tolerate seated position for time needed to transport (_)Hemodynamic monitoring required en route (_)Unable to sit in a chair or wheelchair due to decubitus ulcers or other wounds (_)Cardiac monitoring required en route (_)Morbid obesity requires additional personnel/equipment to safely handle patient (_)Orthopedic device (backboard, halo, pins, traction, brace, wedge, etc,) requiring special handling during transport (_)Other(specify)_______________________________________________    Section III - Signature of Physician or Healthcare Professional  I certify that the above information is true and correct based on my evaluation of this patient, and represent that the patient requires transport by ambulance and that other forms of transport are contraindicated   I understand that this information will be used by the Centers for Medicare and Medicaid Services (CMS) to support the determination of medical necessity for ambulance services, and I represent that I have personal knowledge of the patient's condition at time of transport  (_x) If this box is checked, I also certify that the patient is physically or mentally incapable of signing the ambulance service's claim and that the institution with which I am affiliated has furnished care, services, or assistance to the patient  My signature below is made on behalf of the patient pursuant to 42 CFR §424 36(b)(4)  In accordance with 42 CFR §424 37, the specific reason(s) that the patient is physically or mentally incapable of signing the claim form is as follows: _Patient is unable to sign due to mental status__________      Signature of Physician* or Healthcare Professional______________________________________________________________  Signature Date 12/22/19 (For scheduled repetitive transports, this form is not valid for transports performed more than 60 days after this date)    Printed Name & Credentials of Physician or Healthcare Professional (MD, DO, RN, etc )__Lilia Bernstein DO  *Form must be signed by patient's attending physician for scheduled, repetitive transports   For non-repetitive, unscheduled ambulance transports, if unable to obtain the signature of the attending physician, any of the following may sign (choose appropriate option below)  (_) Physician Assistant (_)  Clinical Nurse Specialist (_)  Registered Nurse  (_)  Nurse Practitioner  (_) Discharge Planner

## 2019-12-22 NOTE — ED PROVIDER NOTES
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 which she thinks she has been sleeping on  She also concerned that he has been abusing Adderall, he overtly states he has not been taking his antipsychotic medications  She notes that the patient is having increasingly bizarre behavior, and drawing and scrubbing on pieces of paper, sterile the walls  States that he has been stating he can make are sticking go 200 miles/gallon, and telling her to hold piece of paper at awkward angles to see missing gears for cars at a not present,  as well as stating that he can manipulate the temperature of water by touching the pipe for a sink  Patient denies suicidal homicidal ideations to me  Denies auditory or visual hallucinations as well  However, when asked again about his thoughts about harming others, he states that he thinks that other people are resistant to him and he will no longer visits them and so he is now spoken with God and will become nothing  Prior to Admission Medications   Prescriptions Last Dose Informant Patient Reported? Taking?    OLANZapine (ZyPREXA) 15 mg tablet   No No   Sig: Take 1 tablet (15 mg total) by mouth daily at bedtime      Facility-Administered Medications: None       Past Medical History:   Diagnosis Date    ADHD (attention deficit hyperactivity disorder)     Anxiety     Psychiatric illness     Psychosis ([facility] ) 11/23/2019    Substance abuse ([facility] )        Past Surgical History:   Procedure Laterality Date    APPENDECTOMY      LASIK         Family History   Problem Relation Age of Onset    Hypertension Mother      I have reviewed and agree with the history as documented  Social History     Tobacco Use    Smoking status: Never Smoker    Smokeless tobacco: Never Used   Substance Use Topics    Alcohol use: Yes     Comment: LAINE    Drug use: Yes     Types: Marijuana        Review of Systems   Constitutional: Negative  Negative for chills and fever  HENT: Negative  Negative for rhinorrhea, sore throat, trouble swallowing and voice change  Eyes: Negative  Negative for pain and visual disturbance  Respiratory: Negative  Negative for cough, shortness of breath and wheezing  Cardiovascular: Negative  Negative for chest pain and palpitations  Gastrointestinal: Negative for abdominal pain, diarrhea, nausea and vomiting  Genitourinary: Negative  Negative for dysuria and frequency  Musculoskeletal: Negative  Negative for neck pain and neck stiffness  Skin: Negative  Negative for rash  Neurological: Negative  Negative for dizziness, speech difficulty, weakness, light-headedness and numbness  Psychiatric/Behavioral: Positive for behavioral problems and sleep disturbance  Negative for suicidal ideas  The patient is hyperactive          Physical Exam  Physical Exam    Vital Signs  ED Triage Vitals [12/22/19 0950]   Temperature Pulse Respirations Blood Pressure SpO2   (!) 97 1 °F (36 2 °C) 87 16 145/83 100 %      Temp Source Heart Rate Source Patient Position - Orthostatic VS BP Location FiO2 (%)   Tympanic Monitor Standing Left arm --      Pain Score       No Pain           Vitals:    12/22/19 0950 12/22/19 1426   BP: 145/83 136/84   Pulse: 87 76   Patient Position - Orthostatic VS: Standing Sitting         Visual Acuity      ED Medications  Medications - No data to display    Diagnostic Studies  Results Reviewed     Procedure Component Value Units Date/Time    Rapid drug screen, urine [[id number]]  (Abnormal) Collected:  12/22/19 1023    Lab Status:  Final result Specimen:  Urine, Clean Catch Updated:  12/22/19 1047     Amph/Meth UR Positive     Barbiturate Ur Negative     Benzodiazepine Urine Negative     Cocaine Urine Negative     Methadone Urine Negative     Opiate Urine Negative     PCP Ur Negative     THC Urine Positive    Narrative:       Presumptive report  If requested, specimen will be sent to reference lab for confirmation  FOR MEDICAL PURPOSES ONLY  IF CONFIRMATION NEEDED PLEASE CONTACT THE LAB WITHIN 5 DAYS  Drug Screen Cutoff Levels:  AMPHETAMINE/METHAMPHETAMINES  1000 ng/mL  BARBITURATES     200 ng/mL  BENZODIAZEPINES     200 ng/mL  COCAINE      300 ng/mL  METHADONE      300 ng/mL  OPIATES      300 ng/mL  PHENCYCLIDINE     25 ng/mL  THC       50 ng/mL      POCT alcohol breath test [[id number]]  (Normal) Resulted:  12/22/19 1023    Lab Status:  Final result Updated:  12/22/19 1023     EXTBreath Alcohol 0 00                 No orders to display              Procedures  Procedures         ED Course  ED Course as of Dec 22 1604   Sun Dec 22, 2019   1317 Medically cleared for crisis evaluation  MDM  Number of Diagnoses or Management Options  Bipolar disorder [facility]):   Diagnosis management comments: 52-year-old male who presented for concerns for history exam as above  He is well appearing, in no acute distress, vitals are okay  Will obtain screening alcohol and urine drug screen  Patient does admit to using marijuana but denies other recreational drugs  He is will need a crisis evaluation for inpatient versus outpatient treatment for his current what appears to be manic episode  Disposition pending evaluation by crisis      Patient was ultimately unable to sign a 201 admission for self, a 2 physician 302 has been filled out and completed  Patient will have bed search and be admitted where appropriate care can be found  Amount and/or Complexity of Data Reviewed  Clinical lab tests: ordered and reviewed  Tests in the medicine section of CPT®: ordered and reviewed          Disposition  Final diagnoses:   Bipolar disorder ([facility] )   [place] ([facility] )   Substance abuse ([facility] )     Time reflects when diagnosis was documented in both MDM as applicable and the Disposition within this note     Time User Action Codes Description Comment    12/22/2019 11:03 AM Kathy Roberts Add [F31 9] Bipolar disorder ([facility] )     12/22/2019  4:04 PM Kathy Roberts Add [F30 9] Rachele ([facility] )     12/22/2019  4:04 PM Kathy Roberts Add [F19 10] Substance abuse [facility])       ED Disposition     None      MD Documentation      Most Recent Value   Sending MD Thea Carpio DO      Follow-up Information    None         Patient's Medications   Discharge Prescriptions    No medications on file     No discharge procedures on file      ED Provider  Electronically Signed by           Pablo Egan DO  12/22/19 8367

## 2019-12-22 NOTE — ED PROVIDER NOTES
Patient was a 52-year-old male brought in for bizarre behavior asked to be seen by myself by prior physician  Patient states that he was brought in because his coworkers ovary thinking everything  He states that his co-worker says he is just working too much and worrying about 2 many things that he should be worried about  He states that he should be worried about these  Patient tells me that he is spot cleaning the sink because that is discussed in any wants to continue cleaning it  When I asked him if he wanted to hurt himself or anybody else patient became very agitated and aggressive  He started screaming at myself stating on have any fucking times I have to tell you that I do not want to hurt anybody  At 1 her knee but he was dead, alive for the [address] [facility]  Multiple times that D escalating patient security at bedside  He continued to be very angry and tangential with poor redirection  I will uphold  a 36 with Dr Martin Valenzuela    7:03 PM patient sleeping  Patient accepted at [facility]) _L   Portions of the record may have been created with voice recognition software  Occasional wrong word or "sound a like" substitutions may have occurred due to the inherent limitations of voice recognition software  Read the chart carefully and recognize, using context, where substitutions have occurred         Cindy Hernandez DO  12/22/19 1903

## 2019-12-22 NOTE — ED NOTES
Pt  Sleeping  Wakes after name was called a couple of times   When asked why he was at the hospital - he stated ask my co- worker - when I asked what he had been doing he stated "working" Denies SI/HI  - very vague with answers      Enrique Colin RN  12/22/19 2892

## 2019-12-23 ENCOUNTER — HOSPITAL ENCOUNTER (INPATIENT)
Facility: HOSPITAL | Age: 44
LOS: 8 days | Discharge: HOME | DRG: 885 | End: 2019-12-31
Attending: PSYCHIATRY & NEUROLOGY | Admitting: PSYCHIATRY & NEUROLOGY

## 2019-12-23 VITALS
BODY MASS INDEX: 25.13 KG/M2 | WEIGHT: 170.19 LBS | HEART RATE: 87 BPM | RESPIRATION RATE: 16 BRPM | DIASTOLIC BLOOD PRESSURE: 88 MMHG | OXYGEN SATURATION: 100 % | TEMPERATURE: 97.5 F | SYSTOLIC BLOOD PRESSURE: 138 MMHG

## 2019-12-23 DIAGNOSIS — F30.9 MANIA (HCC): ICD-10-CM

## 2019-12-23 DIAGNOSIS — F31.2 BIPOLAR AFFECTIVE DISORDER, CURRENT EPISODE MANIC WITH PSYCHOTIC SYMPTOMS (HCC): Primary | ICD-10-CM

## 2019-12-23 DIAGNOSIS — Z00.8 MEDICAL CLEARANCE FOR PSYCHIATRIC ADMISSION: ICD-10-CM

## 2019-12-23 PROBLEM — F15.20 AMPHETAMINE USE DISORDER, SEVERE (HCC): Status: ACTIVE | Noted: 2019-12-23

## 2019-12-23 PROCEDURE — NC001 PR NO CHARGE: Performed by: PSYCHIATRY & NEUROLOGY

## 2019-12-23 PROCEDURE — 99222 1ST HOSP IP/OBS MODERATE 55: CPT | Performed by: PSYCHIATRY & NEUROLOGY

## 2019-12-23 PROCEDURE — 99253 IP/OBS CNSLTJ NEW/EST LOW 45: CPT | Performed by: INTERNAL MEDICINE

## 2019-12-23 RX ORDER — BENZTROPINE MESYLATE 1 MG/ML
1 INJECTION INTRAMUSCULAR; INTRAVENOUS EVERY 6 HOURS PRN
Status: DISCONTINUED | OUTPATIENT
Start: 2019-12-23 | End: 2019-12-23

## 2019-12-23 RX ORDER — HYDROXYZINE HYDROCHLORIDE 25 MG/1
50 TABLET, FILM COATED ORAL EVERY 4 HOURS PRN
Status: DISCONTINUED | OUTPATIENT
Start: 2019-12-23 | End: 2019-12-31 | Stop reason: HOSPADM

## 2019-12-23 RX ORDER — BENZTROPINE MESYLATE 1 MG/ML
1 INJECTION INTRAMUSCULAR; INTRAVENOUS EVERY 6 HOURS PRN
Status: DISCONTINUED | OUTPATIENT
Start: 2019-12-23 | End: 2019-12-31 | Stop reason: HOSPADM

## 2019-12-23 RX ORDER — OLANZAPINE 10 MG/1
10 TABLET ORAL
Status: DISCONTINUED | OUTPATIENT
Start: 2019-12-23 | End: 2019-12-24

## 2019-12-23 RX ORDER — IBUPROFEN 800 MG/1
800 TABLET ORAL EVERY 8 HOURS PRN
Status: DISCONTINUED | OUTPATIENT
Start: 2019-12-23 | End: 2019-12-31 | Stop reason: HOSPADM

## 2019-12-23 RX ORDER — HALOPERIDOL 5 MG/ML
5 INJECTION INTRAMUSCULAR EVERY 6 HOURS PRN
Status: DISCONTINUED | OUTPATIENT
Start: 2019-12-23 | End: 2019-12-31 | Stop reason: HOSPADM

## 2019-12-23 RX ORDER — CLONAZEPAM 1 MG/1
1 TABLET ORAL
Status: COMPLETED | OUTPATIENT
Start: 2019-12-23 | End: 2019-12-27

## 2019-12-23 RX ORDER — IBUPROFEN 600 MG/1
600 TABLET ORAL EVERY 8 HOURS PRN
Status: DISCONTINUED | OUTPATIENT
Start: 2019-12-23 | End: 2019-12-31 | Stop reason: HOSPADM

## 2019-12-23 RX ORDER — RISPERIDONE 1 MG/1
1 TABLET, ORALLY DISINTEGRATING ORAL 2 TIMES DAILY PRN
Status: DISCONTINUED | OUTPATIENT
Start: 2019-12-23 | End: 2019-12-31 | Stop reason: HOSPADM

## 2019-12-23 RX ORDER — HALOPERIDOL 5 MG
5 TABLET ORAL EVERY 8 HOURS PRN
Status: DISCONTINUED | OUTPATIENT
Start: 2019-12-23 | End: 2019-12-23

## 2019-12-23 RX ORDER — BENZTROPINE MESYLATE 1 MG/1
1 TABLET ORAL EVERY 6 HOURS PRN
Status: DISCONTINUED | OUTPATIENT
Start: 2019-12-23 | End: 2019-12-31 | Stop reason: HOSPADM

## 2019-12-23 RX ORDER — BENZTROPINE MESYLATE 0.5 MG/1
0.5 TABLET ORAL EVERY 6 HOURS PRN
Status: DISCONTINUED | OUTPATIENT
Start: 2019-12-23 | End: 2019-12-23

## 2019-12-23 RX ORDER — OLANZAPINE 10 MG/1
10 INJECTION, POWDER, LYOPHILIZED, FOR SOLUTION INTRAMUSCULAR
Status: DISCONTINUED | OUTPATIENT
Start: 2019-12-23 | End: 2019-12-31 | Stop reason: HOSPADM

## 2019-12-23 RX ORDER — TRAZODONE HYDROCHLORIDE 50 MG/1
50 TABLET ORAL
Status: DISCONTINUED | OUTPATIENT
Start: 2019-12-23 | End: 2019-12-31 | Stop reason: HOSPADM

## 2019-12-23 RX ORDER — MAGNESIUM HYDROXIDE/ALUMINUM HYDROXICE/SIMETHICONE 120; 1200; 1200 MG/30ML; MG/30ML; MG/30ML
20 SUSPENSION ORAL EVERY 4 HOURS PRN
Status: DISCONTINUED | OUTPATIENT
Start: 2019-12-23 | End: 2019-12-31 | Stop reason: HOSPADM

## 2019-12-23 RX ORDER — LORAZEPAM 1 MG/1
1 TABLET ORAL EVERY 6 HOURS PRN
Status: DISCONTINUED | OUTPATIENT
Start: 2019-12-23 | End: 2019-12-23

## 2019-12-23 RX ORDER — LORAZEPAM 2 MG/ML
2 INJECTION INTRAMUSCULAR EVERY 6 HOURS PRN
Status: DISCONTINUED | OUTPATIENT
Start: 2019-12-23 | End: 2019-12-31 | Stop reason: HOSPADM

## 2019-12-23 RX ORDER — HYDROXYZINE HYDROCHLORIDE 25 MG/1
25 TABLET, FILM COATED ORAL EVERY 6 HOURS PRN
Status: DISCONTINUED | OUTPATIENT
Start: 2019-12-23 | End: 2019-12-31 | Stop reason: HOSPADM

## 2019-12-23 RX ORDER — IBUPROFEN 400 MG/1
400 TABLET ORAL EVERY 8 HOURS PRN
Status: DISCONTINUED | OUTPATIENT
Start: 2019-12-23 | End: 2019-12-31 | Stop reason: HOSPADM

## 2019-12-23 RX ORDER — HALOPERIDOL 5 MG
5 TABLET ORAL EVERY 6 HOURS PRN
Status: DISCONTINUED | OUTPATIENT
Start: 2019-12-23 | End: 2019-12-31 | Stop reason: HOSPADM

## 2019-12-23 RX ADMIN — CLONAZEPAM 1 MG: 1 TABLET ORAL at 21:28

## 2019-12-23 RX ADMIN — OLANZAPINE 10 MG: 10 TABLET, FILM COATED ORAL at 21:28

## 2019-12-23 NOTE — PROGRESS NOTES
MHT gave patient 1 pair of underwear until the rest of patient belongings can be gone through with patient

## 2019-12-23 NOTE — PLAN OF CARE
Problem: Alteration in Thoughts and Perception  Goal: Treatment Goal: Gain control of psychotic behaviors/thinking, reduce/eliminate presenting symptoms and demonstrate improved reality functioning upon discharge  Outcome: Not Progressing  Goal: Verbalize thoughts and feelings  Description  Interventions:  - Promote a nonjudgmental and trusting relationship with the patient through active listening and therapeutic communication  - Assess patient's level of functioning, behavior and potential for risk  - Engage patient in 1 on 1 interactions  - Encourage patient to express fears, feelings, frustrations, and discuss symptoms    - [place] patient to reality, help patient recognize reality-based thinking   - Administer medications as ordered and assess for potential side effects  - Provide the patient education related to the signs and symptoms of the illness and desired effects of prescribed medications  Outcome: Not Progressing  Goal: Refrain from acting on delusional thinking/internal stimuli  Description  Interventions:  - Monitor patient closely, per order   - Utilize least restrictive measures   - Set reasonable limits, give positive feedback for acceptable   - Administer medications as ordered and monitor of potential side effects  Outcome: Not Progressing  Goal: Agree to be compliant with medication regime, as prescribed and report medication side effects  Description  Interventions:  - Offer appropriate PRN medication and supervise ingestion; conduct AIMS, as needed   Outcome: Not Progressing  Goal: Attend and participate in unit activities, including therapeutic, recreational, and educational groups  Description  Interventions:  -Encourage Visitation and family involvement in care  Outcome: Not Progressing  Goal: Recognize dysfunctional thoughts, communicate reality-based thoughts at the time of discharge  Description  Interventions:  - Provide medication and psycho-education to assist patient in compliance and developing insight into his/her illness   Outcome: Not Progressing  Goal: Complete daily ADLs, including personal hygiene independently, as able  Description  Interventions:  - Observe, teach, and assist patient with ADLS  - Monitor and promote a balance of rest/activity, with adequate nutrition and elimination   Outcome: Not Progressing     Problem: Ineffective Coping  Goal: Cooperates with admission process  Description  Interventions:   - Complete admission process  Outcome: Not Progressing  Goal: Identifies ineffective coping skills  Outcome: Not Progressing  Goal: Identifies healthy coping skills  Outcome: Not Progressing  Goal: Demonstrates healthy coping skills  Outcome: Not Progressing  Goal: Participates in unit activities  Description  Interventions:  - Provide therapeutic environment   - Provide required programming   - Redirect inappropriate behaviors   Outcome: Not Progressing  Goal: Patient/Family participate in treatment and DC plans  Description  Interventions:  - Provide therapeutic environment  Outcome: Not Progressing  Goal: Patient/Family verbalizes awareness of resources  Outcome: Not Progressing  Goal: Understands least restrictive measures  Description  Interventions:  - Utilize least restrictive behavior  Outcome: Not Progressing  Goal: Free from restraint events  Description  - Utilize least restrictive measures   - Provide behavioral interventions   - Redirect inappropriate behaviors   Outcome: Not Progressing     Problem: Alteration in Orientation  Goal: Treatment Goal: Demonstrate a reduction of confusion and improved orientation to person, place, time and/or situation upon discharge, according to optimum baseline/ability  Outcome: Not Progressing  Goal: Interact with staff daily  Description  Interventions:  - Assess and re-assess patient's level of orientation  - Engage patient in 1 on 1 interactions, daily, for a minimum of 15 minutes   - Establish rapport/trust with patient Outcome: Not Progressing  Goal: Express concerns related to confused thinking related to:  Description  Interventions:  - Encourage patient to express feelings, fears, frustrations, hopes  - Assign consistent caregivers   - [place]/re-orient patient as needed  - Allow comfort items, as appropriate  - Provide visual cues, signs, etc    Outcome: Not Progressing  Goal: Allow medical examinations, as recommended  Description  Interventions:  - Provide physical/neurological exams and/or referrals, per provider   Outcome: Not Progressing  Goal: Cooperate with recommended testing/procedures  Description  Interventions:  - Determine need for ancillary testing  - Observe for mental status changes  - Implement falls/precaution protocol   Outcome: Not Progressing  Goal: Attend and participate in unit activities, including therapeutic, recreational, and educational groups  Description  Interventions:  - Provide therapeutic and educational activities daily, encourage attendance and participation, and document same in the medical record   - Provide appropriate opportunities for reminiscence   - Provide a consistent daily routine   - Encourage family contact/visitation   Outcome: Not Progressing  Goal: Complete daily ADLs, including personal hygiene independently, as able  Description  Interventions:  - Observe, teach, and assist patient with ADLS  Outcome: Not Progressing

## 2019-12-23 NOTE — PROGRESS NOTES
Suicide/Homicide Risk Assessment:    Risk of Harm to Self:    The following ratings are based on assessment at the time of the interview   Demographic risk factors include:  status   Historical Risk Factors include: substance use   Current Specific Risk Factors include: diagnosis of mood disorder   Protective Factors: ability to manage anger well   Weapons: none  The following steps have been taken to ensure weapons are properly secured: not applicable    Risk of Harm to Others:   The following ratings are based on assessment at the time of the interview   Demographic Risk Factors include: male   Historical Risk Factors include: none     Current Specific Risk Factors include: current agitation   Protective Factors: no current homicidal ideation, follows staff redirection    The following interventions are recommended: behavioral checks every 7 minutes, continued hospitalization on locked unit

## 2019-12-23 NOTE — ASSESSMENT & PLAN NOTE
[place] for psychiatric admission  Patient denies any underlying medical comorbidities  Patient did have FLP and TSH done during last psychiatric admission last month which appears within reasonable limits

## 2019-12-23 NOTE — ED NOTES
Patient is again cleaning the restroom  Patient is not violent towards anyone or attempting to harm himself in any way        [place] Feliciaside  12/22/19 1902

## 2019-12-23 NOTE — LETTER
December 31, 2019     [facility] [address]: [place]    Patient: Bib Sanchez   YOB: 1975   Date of Visit: 12/23/2019       Dear Dr Olga Chandler:    Thank you for referring Bib Sanchez to me for evaluation  Below are the relevant portions of my H&P  If you have questions, please do not hesitate to call me  I look forward to following Tarsha Martel along with you  Sincerely,        No name on file  CC: No Recipients  Lucrecia Landis MD  12/23/2019  1:26 PM  Signed  Psychiatric Evaluation - Behavioral Health   Bib Sanchez 38 y o  male MRN: [id number]  Unit/Bed#: -02 Encounter: [id number]    Assessment/Plan   Principal Problem:    Bipolar affective disorder, current episode manic with psychotic symptoms ([facility] [address] )  Active Problems:    Amphetamine use disorder, severe ([facility] )    Plan:   Risks, benefits and possible side effects of Medications:   Risks, benefits, and possible side effects of medications explained to patient and patient verbalizes understanding  Chief Complaint: Manic episode    History of Present Illness     Patient is a 38 y o  male on his 2nd psychiatric hospitalization within a month coming on a 302 due to very manic and psychotic behaviors  Patient presented to the ER with an employee of his business due to her retic and a regular behavior  In the ER patient kept talking about grandiose ideas and inventions that will make him a trilionnaire  Patient seemed very hyperverbal with pressured speech and flight of ideas  Patient reported that he discovered the cure for heroin addiction which is a Narcan and kept going on tangential subjects  Per his sister the patient has been dysfunctional for the past 2 months as he sits in his office scribbling things and coming up with irrational ideas  She reports that he was not able to take care of his business due to his current state    Sister denies any knowledge of drug use issues are mental health issues in the family  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         Psychiatric Review Of Systems:  sleep:  Patient has not slept for several days  appetite changes: yes  weight changes: yes  energy/anergy: yes  interest/pleasure/anhedonia: yes  somatic symptoms: no  anxiety/panic: no  ricardo: yes  guilty/hopeless: no  self injurious behavior/risky behavior: no    Historical Information     Past Psychiatric History:   None    Substance Abuse History:  Social History     Tobacco History     Smoking Status  Never Smoker    Smokeless Tobacco Use  Never Used          Alcohol History     Alcohol Use Status  Yes Comment  LAINE          Drug Use     Drug Use Status  Yes Types  Marijuana          Sexual Activity     Sexually Active  Not Currently Comment  LAINE          Activities of Daily Living    Not Asked               Additional Substance Use Detail     Questions Responses    Substance Use Assessment Substance use within the past 12 months    Alcohol Use Frequency Experimented    Cannabis frequency 3 or more times/week    Comment: 3 or more times/week on 11/23/2019     Heroin Frequency Denies use in past 12 months    Cocaine frequency Never used    Comment: Never used on 11/23/2019     Crack Cocaine Frequency Denies use in past 12 months    Methamphetamine Frequency Denies use in past 12 months    Narcotic Frequency Denies use in past 12 months    Benzodiazepine Frequency Denies use in past 12 months    Amphetamine frequency Denies use in past 12 months    Barbituate Frequency Denies use use in past 12 months    Inhalant frequency Never used    Comment: Never used on 11/23/2019     Hallucinogen frequency Never used    Comment: Never used on 11/23/2019     Ecstasy frequency Never used    Comment: Never used on 11/23/2019     Other drug frequency Never used    Comment: Never used on 11/23/2019     Opiate frequency Denies use in past 12 months    Not reviewed  I have assessed this patient for substance use within the past 12 months    Family Psychiatric History:   Denied    Social History:  Education: high school diploma/GED        Past Medical History:   Diagnosis Date    ADHD (attention deficit hyperactivity disorder)     Anxiety     Bipolar affective disorder, current episode manic with psychotic symptoms ([facility] ) 12/23/2019    Psychiatric illness     Psychosis ([place] [address] ) 11/23/2019    Substance abuse ([place] [address] )        Medical Review Of Systems:  Pertinent items are noted in HPI  Meds/Allergies   all current active meds have been reviewed  No Known Allergies    Objective   Vital signs in last 24 hours:  Temp:  [97 5 °F (36 4 °C)-97 6 °F (36 4 °C)] 97 6 °F (36 4 °C)  HR:  [] 104  Resp:  [16-18] 18  BP: (116-138)/(81-88) 116/83    No intake or output data in the 24 hours ending 12/23/19 1315    Mental Status Evaluation:  Appearance:  disheveled   Behavior:  psychomotor agitation   Speech:  loud and pressured   Mood:  labile   Affect:  increased in intensity   Language: repeating phrases   Thought Process:  illogical   Thought Content:  delusions  persecutory   Perceptual Disturbances: None   Risk Potential: Potential for Aggression No   Sensorium:  person and place   Cognition:  grossly intact   Consciousness:  alert and awake    Attention: attention span and concentration were age appropriate   Intellect: within normal limits   Fund of Knowledge: awareness of current events: poor   Insight:  limited   Judgment: limited   Muscle Strength and Tone: face and neck   Gait/Station: normal gait/station   Motor Activity: no abnormal movements     Lab Results: I have personally reviewed pertinent lab results          Patient Strengths/Assets: capable of independent living, sense of humor, supportive family/friends    Patient Barriers/Limitations: self-care deficit    Counseling / Coordination of Care  Total floor / unit time spent today 60 minutes  Greater than 50% of total time was spent with the patient and / or family counseling and / or coordination of care   A description of the counseling / coordination of care:

## 2019-12-23 NOTE — ASSESSMENT & PLAN NOTE
Bipolar affective disorder with delusions and grandiose  Appears to be associated with high dose amphetamine use  Management per psychiatry

## 2019-12-23 NOTE — H&P
Psychiatric Evaluation - [facility] 38 y o  male MRN: [id number]  Unit/Bed#: [facility] 220-02 Encounter: [id number]    Assessment/Plan   Principal Problem:    Bipolar affective disorder, current episode manic with psychotic symptoms ([facility] )  Active Problems:    Amphetamine use disorder, severe ([facility] )    Plan:   Risks, benefits and possible side effects of Medications:   Risks, benefits, and possible side effects of medications explained to patient and patient verbalizes understanding  Chief Complaint: Manic episode    History of Present Illness     Patient is a 38 y o  male on his 2nd psychiatric hospitalization within a month coming on a 302 due to very manic and psychotic behaviors  Patient presented to the ER with an employee of his business due to her retic and a regular behavior  In the ER patient kept talking about grandiose ideas and inventions that will make him a trilionnaire  Patient seemed very hyperverbal with pressured speech and flight of ideas  Patient reported that he discovered the cure for heroin addiction which is a Narcan and kept going on tangential subjects  Per his sister the patient has been dysfunctional for the past 2 months as he sits in his office scribbling things and coming up with irrational ideas  She reports that he was not able to take care of his business due to his current state  Sister denies any knowledge of drug use issues are mental health issues in the family  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         Psychiatric Review Of Systems:  sleep:  Patient has not slept for several days  appetite changes: yes  weight changes: yes  energy/anergy: yes  interest/pleasure/anhedonia: yes  somatic symptoms: no  anxiety/panic: no  ricardo: yes  guilty/hopeless: no  self injurious behavior/risky behavior: no    Historical Information     Past Psychiatric History:   None    Substance Abuse History:  Social History     Tobacco History     Smoking Status  Never Smoker    Smokeless Tobacco Use  Never Used          Alcohol History     Alcohol Use Status  Yes Comment  LAINE          Drug Use     Drug Use Status  Yes Types  Marijuana          Sexual Activity     Sexually Active  Not Currently Comment  LAINE          Activities of Daily Living    Not Asked               Additional Substance Use Detail     Questions Responses    Substance Use Assessment Substance use within the past 12 months    Alcohol Use Frequency Experimented    Cannabis frequency 3 or more times/week    Comment: 3 or more times/week on 11/23/2019     Heroin Frequency Denies use in past 12 months    Cocaine frequency Never used    Comment: Never used on 11/23/2019     Crack Cocaine Frequency Denies use in past 12 months    Methamphetamine Frequency Denies use in past 12 months    Narcotic Frequency Denies use in past 12 months    Benzodiazepine Frequency Denies use in past 12 months    Amphetamine frequency Denies use in past 12 months    Barbituate Frequency Denies use use in past 12 months    Inhalant frequency Never used    Comment: Never used on 11/23/2019     Hallucinogen frequency Never used    Comment: Never used on 11/23/2019     Ecstasy frequency Never used    Comment: Never used on 11/23/2019     Other drug frequency Never used    Comment: Never used on 11/23/2019     Opiate frequency Denies use in past 12 months    Not reviewed          I have assessed this patient for substance use within the past 12 months    Family Psychiatric History:   Denied    Social History:  Education: high school diploma/GED        Past Medical History:   Diagnosis Date    ADHD (attention deficit hyperactivity disorder)     Anxiety     Bipolar affective disorder, current episode manic with psychotic symptoms ([facility] ) 12/23/2019    Psychiatric illness     Psychosis ([facility] ) 11/23/2019    Substance abuse [facility])        Medical Review Of Systems:  Pertinent items are noted in HPI  Meds/Allergies   all current active meds have been reviewed  No Known Allergies    Objective   Vital signs in last 24 hours:  Temp:  [97 5 °F (36 4 °C)-97 6 °F (36 4 °C)] 97 6 °F (36 4 °C)  HR:  [] 104  Resp:  [16-18] 18  BP: (116-138)/(81-88) 116/83    No intake or output data in the 24 hours ending 12/23/19 1315    Mental Status Evaluation:  Appearance:  disheveled   Behavior:  psychomotor agitation   Speech:  loud and pressured   Mood:  labile   Affect:  increased in intensity   Language: repeating phrases   Thought Process:  illogical   Thought Content:  delusions  persecutory   Perceptual Disturbances: None   Risk Potential: Potential for Aggression No   Sensorium:  person and place   Cognition:  grossly intact   Consciousness:  alert and awake    Attention: attention span and concentration were age appropriate   Intellect: within normal limits   Fund of Knowledge: awareness of current events: poor   Insight:  limited   Judgment: limited   Muscle Strength and Tone: face and neck   Gait/Station: normal gait/station   Motor Activity: no abnormal movements     Lab Results: I have personally reviewed pertinent lab results  Patient Strengths/Assets: capable of independent living, sense of humor, supportive family/friends    Patient Barriers/Limitations: self-care deficit    Counseling / Coordination of Care  Total floor / unit time spent today 60 minutes  Greater than 50% of total time was spent with the patient and / or family counseling and / or coordination of care   A description of the counseling / coordination of care:

## 2019-12-23 NOTE — TREATMENT PLAN
Treatment Plan [address] 38 y o  male MRN: [id number]    [address]  822-48 Encounter: [id number]          Admit Date/Time:  12/23/2019  9:53 AM    Treatment Team: Attending Provider: Marcelle Edwards MD; Registered Nurse: Kendra Car RN; Consulting Physician: Estrella Novak DO    Diagnosis: Principal Problem:    Bipolar affective disorder, current episode manic with psychotic symptoms ([facility] )  Active Problems:    Amphetamine use disorder, severe ([facility] )        Patient Strengths: stable/recent employment, supportive family/friends, work skills     Patient Barriers: marital/family conflict    Progress Toward Goals: ongoing    Recommended Treatment: discharge planning     Treatment Frequency: 1-2 times per week     Expected Discharge Date:     Discharge Plan: return to previous living arrangement     Treatment Plan Created/Updated By: Marcelle Edwards MD

## 2019-12-23 NOTE — PROGRESS NOTES
Pt arrived on a 302 from [address] [facility] ER  Pt is manic, paranoid, disorganized, and grandiose  Speech is loud, pressured, and tangential  Pt is poor hx and unable to answer most assessment questions  When asked what brought pt to hospital, he states that he has found the cure for heroin addiction  Pt does deny SI, HI, A/T/V  Pt denies pertinent PMH, but does state he was admitted to a [facility] in the past  Monitoring continues

## 2019-12-23 NOTE — CONSULTS
Consult- Ayleen Holden 1975, 38 y o  male MRN: [id number]  Unit/Bed#: [facility] 220-02 Encounter: [id number]  Primary Care Provider: No primary care provider on file  Date and time admitted to hospital: 12/23/2019  9:53 AM      Inpatient consult for Medical Clearance for [address] patient  Consult performed by: Lamar Slater DO  Consult ordered by: Dread Fernandez MD      ASSESSMENT AND PLAN  * Bipolar affective disorder, current episode manic with psychotic symptoms [facility])  Assessment & Plan  Bipolar affective disorder with delusions and grandiose  Appears to be associated with high dose amphetamine use  Management per psychiatry  Medical clearance for psychiatric admission  Assessment & Plan  [place] for psychiatric admission  Patient denies any underlying medical comorbidities  Patient did have FLP and TSH done during last psychiatric admission last month which appears within reasonable limits  Thank you for this consultation, we will follow along with you during this hospitalization as needed  _____________________________________________________________________________    HPI: Ayleen Holden is a 38y o  year old male who presents with under 302 for manic behavior  The patient was hospitalized last month at [facility] for similar  The patient was seen in the ED with ideas of grandiose and continues to have different and then shins  During examination he continues to have flow sheets of a cure for heroin epidemic  He has been seen by psychiatry this morning in behavior was thought exacerbated by high-dose amphetamine use  He denies any other systemic comorbidities  Denies any chest pain or shortness of breath  Does have right hip pains  ALLERGIES  No Known Allergies  HOME MEDICATIONS  Prior to Admission Medications   Prescriptions Last Dose Informant Patient Reported? Taking?    Amphetamine-Dextroamphetamine (ADDERALL PO)   Yes No   Sig: Take by mouth   OLANZapine (ZyPREXA) 15 mg tablet No No   Sig: Take 1 tablet (15 mg total) by mouth daily at bedtime   Patient not taking: Reported on 12/22/2019      Facility-Administered Medications: None     CURRENT MEDICATIONS  Current Facility-Administered Medications   Medication Dose Route Frequency Provider Last Rate Last Dose    aluminum-magnesium hydroxide-simethicone (MYLANTA) 200-200-20 mg/5 mL oral suspension 20 mL  20 mL Oral Q4H PRN Christine Edward MD        benztropine (COGENTIN) injection 1 mg  1 mg Intramuscular Q6H PRN Shirley Varela, [facility]        benztropine (COGENTIN) tablet 1 mg  1 mg Oral Q6H PRN Shirley Varela, [facility]        clonazePAM (KlonoPIN) tablet 1 mg  1 mg Oral HS Jil Hernández MD        haloperidol (HALDOL) tablet 5 mg  5 mg Oral Q6H PRN Shirley Varela, [facility]        haloperidol lactate (HALDOL) injection 5 mg  5 mg Intramuscular Q6H PRN Shirley Varela, [facility]        hydrOXYzine HCL (ATARAX) tablet 25 mg  25 mg Oral Q6H PRN Christine Edward MD        hydrOXYzine HCL (ATARAX) tablet 50 mg  50 mg Oral Q4H PRN [facility] Paul        ibuprofen (MOTRIN) tablet 400 mg  400 mg Oral Q8H PRN Christine Edward MD        ibuprofen (MOTRIN) tablet 600 mg  600 mg Oral Q8H PRN Christine Edward MD        ibuprofen (MOTRIN) tablet 800 mg  800 mg Oral Q8H PRN Christine Edward MD        LORazepam (ATIVAN) 2 mg/mL injection 2 mg  2 mg Intramuscular Q6H PRN Christine Edward MD        magnesium hydroxide (MILK OF MAGNESIA) 400 mg/5 mL oral suspension 30 mL  30 mL Oral Daily PRN Christine Edward MD        OLANZapine (ZyPREXA) IM injection 10 mg  10 mg Intramuscular Q3H PRN [facility] Paul        OLANZapine (ZyPREXA) tablet 10 mg  10 mg Oral HS Jil Hernández MD        risperiDONE (RisperDAL M-TABS) dispersible tablet 1 mg  1 mg Oral BID PRN Christine Edward MD        traZODone (DESYREL) tablet 50 mg  50 mg Oral HS PRN Christine Edward MD         PAST HISTORY  Past Medical History:   Diagnosis Date    ADHD (attention deficit hyperactivity disorder)     Anxiety     Bipolar affective disorder, current episode manic with psychotic symptoms ([place] [address] ) 12/23/2019    Psychiatric illness     Psychosis ([place] [address] ) 11/23/2019    Substance abuse ([place] [address] )      Past Surgical History:   Procedure Laterality Date    APPENDECTOMY      LASIK       SOCIAL HISTORY  Social History     Socioeconomic History    Marital status:      Spouse name: Not on file    Number of children: Not on file    Years of education: Not on file    Highest education level: Not on file   Occupational History    Not on file   Social Needs    Financial resource strain: Not on file    Food insecurity:     Worry: Not on file     Inability: Not on file    Transportation needs:     Medical: Not on file     Non-medical: Not on file   Tobacco Use    Smoking status: Never Smoker    Smokeless tobacco: Never Used   Substance and Sexual Activity    Alcohol use: Yes     Comment: LAINE    Drug use: Yes     Types: Marijuana    Sexual activity: Not Currently     Comment: LAINE   Lifestyle    Physical activity:     Days per week: Not on file     Minutes per session: Not on file    Stress: Not on file   Relationships    Social connections:     Talks on phone: Not on file     Gets together: Not on file     Attends Jain service: Not on file     Active member of club or organization: Not on file     Attends meetings of clubs or organizations: Not on file     Relationship status: Not on file    Intimate partner violence:     Fear of current or ex partner: Not on file     Emotionally abused: Not on file     Physically abused: Not on file     Forced sexual activity: Not on file   Other Topics Concern    Not on file   Social History Narrative    Not on file     FAMILY HISTORY  Family History   Problem Relation Age of Onset    Hypertension Mother        REVIEW OF SYSTEMS  History obtained from chart review and the patient  General ROS: negative for - chills or fever  Psychological ROS: positive for - hallucinations and mood swings  Ophthalmic ROS: negative for - loss of vision  Respiratory ROS: negative for - cough or shortness of breath  Cardiovascular ROS: negative for - chest pain  Gastrointestinal ROS: negative for - abdominal pain or diarrhea  Genito-Urinary ROS: negative for - hematuria  Musculoskeletal ROS: positive for - pain in right hip  Neurological ROS: negative for - seizures  Otherwise, all other 12 point review of systems normal     OBJECTIVE  Temp:  [97 5 °F (36 4 °C)-97 6 °F (36 4 °C)] 97 6 °F (36 4 °C)  HR:  [] 104  Resp:  [16-18] 18  BP: (116-138)/(81-88) 116/83    PHYSICAL EXAM  General appearance: alert, appears stated age and cooperative  Skin: Skin color, texture, turgor normal  No rashes or lesions  Head: Normocephalic, without obvious abnormality, atraumatic  Eyes: conjunctivae/corneas clear  PERRL, EOM's intact  Lungs: clear to auscultation bilaterally  Heart: regular rate and rhythm, S1, S2 normal, no murmur, click, rub or gallop  Abdomen: soft, non-tender; bowel sounds normal; no masses,  no organomegaly  Back: range of motion normal  Extremities: extremities normal, atraumatic, no cyanosis or edema  Neurologic: Grossly normal but anxious with flight of ideas    Lab Results: I have personally reviewed pertinent reports  Results from last 7 days   Lab Units 12/22/19  1023   AMPH/METH  Positive*   BARBITURATE UR  Negative   BENZODIAZEPINE UR  Negative   COCAINE UR  Negative   METHADONE URINE  Negative   OPIATE UR  Negative   PCP UR  Negative   THC UR  Positive*       Imaging:   No results found  Total Time for Visit, including Counseling / Coordination of Care: 35 mins  Greater than 50% of this total time spent on direct patient counseling and coordination of care  ** Please Note: This note has been constructed using a voice recognition system   **

## 2019-12-24 PROCEDURE — 99232 SBSQ HOSP IP/OBS MODERATE 35: CPT | Performed by: NURSE PRACTITIONER

## 2019-12-24 RX ORDER — OLANZAPINE 10 MG/1
10 TABLET ORAL 2 TIMES DAILY
Status: DISCONTINUED | OUTPATIENT
Start: 2019-12-24 | End: 2019-12-28

## 2019-12-24 RX ADMIN — OLANZAPINE 10 MG: 10 TABLET, FILM COATED ORAL at 11:21

## 2019-12-24 RX ADMIN — CLONAZEPAM 1 MG: 1 TABLET ORAL at 22:22

## 2019-12-24 RX ADMIN — OLANZAPINE 10 MG: 10 TABLET, FILM COATED ORAL at 17:20

## 2019-12-24 NOTE — PROGRESS NOTES
12/24/19 1418   Team Meeting   Meeting Type Tx Team Meeting   Initial Conference Date 12/24/19   Next Conference Date 01/22/20   Team Members Present   Team Members Present Physician;Nurse;   Physician Team Member Dr Daren Goldman MD   Nursing Team Member Celeste Sabillon, MERLY   Social Work Team Member Iesha Maya, [place]   Patient/Family Present   Patient Present Yes   Patient's Family Present No     Patient unable to participate in treatment plan due to extreme ricardo and no insight  Team discussed concerns for the Marijuana abuse and Adderrall abuse  Patient's sister arrived to unit today; she feels that the drug use and the constant jacobo for his 15year old daughter are triggers  Team also agreed on goals for Alteration in Perception, Ineffective Coping, Discharge Planning, and Ricardo  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

## 2019-12-24 NOTE — SOCIAL WORK
FRANKO attempted to meet with pt to complete psychosocial assessment  SW able to introduce herself and get pt out of bed  Pt immediately started asked for hygiene products and informed he will have to get it from the   FRANKO informed pt that she will meet with him later

## 2019-12-24 NOTE — PROGRESS NOTES
Patient admitted on Monday, December 23, 2019  Belongings inventoried on Tuesday, December 24, 2019 at 0200  Patient had no home medications or wallet/money       The following items were given to patient to remain at bedside:  Pants x 1  Shirt x 1  Socks x 1    The following items were placed into storage:  Winter x 1  Hat x 1  Shoes x 1 pair

## 2019-12-24 NOTE — PROGRESS NOTES
Pt was seen on the unit in his room at the beginning of the shift  Pt decline to have blood work or partake in breakfast  Pt denies current si/hi at this time  Pt remains hyperverbal , paranoid  Pt reported that he believes the staff here are "not qualified to provide care " pt was offered his Zyprexa 10 mg to which he received the medication without any issues  Pt however did assume that he was getting the medication because he was getting loud ,but was told that it was scheduled at this time  Pt is currently resting in his room and is sleeping

## 2019-12-24 NOTE — PROGRESS NOTES
12/24/19 0923   Team Meeting   Meeting Type Daily Rounds   Initial Conference Date 12/24/19   Patient/Family Present   Patient Present No   Patient's Family Present No   Daily Rounds Documentation    Team Members Present:   MD Rosanne Alcantara, RN  Michael Grover, LSW   Tamia Erp, LCSW    Withdrew large sums of money and spent inappropriately  Script for Adderall - more than max dose  Not violent  Manic  302 expires on 12/27/19  No insurance  Grandiose ideas    Delusional

## 2019-12-24 NOTE — SOCIAL WORK
Phone call placed to pt's sister Migdalia Marcus who was on the unit earlier and spoke to pt's nurse and the psychiatrist   Aly Winslow introduced herself as pt's  and explained her role  Migdalia Marcus informed SW that she can't talk now but that she will want to talk later; she has no emergent needs

## 2019-12-24 NOTE — PROGRESS NOTES
Progress Note - Behavioral Health     Tonio Marsh 38 y o  male MRN: [id number]   Unit/Bed#: [facility] 220-02 Encounter: [id number]    Behavior over the last 24 hours:      Jimenez Pham was seen for an inpatient follow-up psychiatric visit this date  At today's visit, he remains tangential, delusional, paranoid, and overtly manic and psychotic  He paces back and forth during assessment  He states that everyone here is trying to rape people and beat them into submission   He believes he is in charge of a group of  who are working on having all hospital staff arrested  He is taking medications with minimal improvement thus far      ROS: no complaints, all other systems are negative    Mental Status Evaluation:    Appearance:  disheveled, dressed inappropropriately, Wearing hospital blanket as [place] and head wrap   Behavior:  agitated, bizarre, uncooperative, psychomotor agitation, gesturing, inappropriate, sarcastic   Speech:  pressured, hypertalkative, loud   Mood:  manic   Affect:  labile, expansive, increased in intensity   Thought Process:  disorganized, illogical, circumstantial, tangential   Associations: tangential associations   Thought Content:  persecutory delusions, paranoid ideation   Perceptual Disturbances: denies auditory hallucinations when asked   Risk Potential: Suicidal ideation - None  Homicidal ideation - angry, hostile feelings with no homicidal plan  Potential for aggression - No   Sensorium:  oriented to person, place and time/date   Memory:  recent and remote memory grossly intact   Consciousness:  alert and awake   Attention: poor concentration and poor attention span   Insight:  poor   Judgment: poor   Gait/Station: normal gait/station, normal balance   Motor Activity: no abnormal movements     Vital signs in last 24 hours:    Temp:  [97 8 °F (36 6 °C)-98 4 °F (36 9 °C)] 97 8 °F (36 6 °C)  HR:  [56-79] 56  Resp:  [16-18] 16  BP: (114-132)/(87-90) 114/90    Laboratory results:  I have personally reviewed all pertinent laboratory/tests results  Progress Toward Goals: poor insight, poor reality testing, Remains manic and psychotic    Assessment/Plan   Principal Problem:    Bipolar affective disorder, current episode manic with psychotic symptoms ([facility] )  Active Problems:    Medical clearance for psychiatric admission    Amphetamine use disorder, severe ([facility] )    Recommended Treatment:     Zyprexa increased to 10 mg b i d  Due to psychomotor agitation, ricardo, and florid psychosis  Will continue to monitor  Discharge disposition and planning are ongoing        All current active medications have been reviewed  Encourage group therapy, milieu therapy and occupational therapy  Behavioral Health checks every 7 minutes      Current Facility-Administered Medications:  aluminum-magnesium hydroxide-simethicone 20 mL Oral Q4H PRN Mario Villela MD   benztropine 1 mg Intramuscular Q6H PRN Shirley Varela, CRNP   benztropine 1 mg Oral Q6H PRN Shirley Varela, CRNP   clonazePAM 1 mg Oral HS Jory Barksdale MD   haloperidol 5 mg Oral Q6H PRN Shirley Varela, CRNP   haloperidol lactate 5 mg Intramuscular Q6H PRN Shirley Varela, CRNP   hydrOXYzine HCL 25 mg Oral Q6H PRN Mario Villela MD   hydrOXYzine HCL 50 mg Oral Q4H PRN Shirley Varela, CRNP   ibuprofen 400 mg Oral Q8H PRN Mario Villela MD   ibuprofen 600 mg Oral Q8H PRN Mario Villela MD   ibuprofen 800 mg Oral Q8H PRKENDRICK Villela MD   LORazepam 2 mg Intramuscular Q6H PRN Mario Villela MD   magnesium hydroxide 30 mL Oral Daily PRN Mario Villela MD   OLANZapine 10 mg Intramuscular Q3H PRN Shirley Varela, CRNP   OLANZapine 10 mg Oral BID Shirley Varela, CRNP   risperiDONE 1 mg Oral BID PRKENDRICK Villela MD   traZODone 50 mg Oral HS PRN Mario Villela MD       Risks / Benefits of Treatment:    Risks, benefits, and possible side effects of medications explained to patient and patient verbalizes understanding and agreement for treatment  Counseling / Coordination of Care:      Patient's progress discussed with staff in treatment team meeting  Medications, treatment progress and treatment plan reviewed with patient

## 2019-12-24 NOTE — SOCIAL WORK
SW met with pt to complete psychosocial assessment  Pt first fixated on leaving the unit and asking SW how to get out  SW explained how he is a involuntary commitment which he somewhat seemed to understand  He believes he should be voluntary  SW explained 302 and pt got mad when SW explained that at minimum he is here 5 days; SW attempted how a 303 could be petitioned but pt cut SW off and made it clear that he thinks she doesn't know what she is talking about  Pt attempted to get SW to follow him and SW informed him that she wants to do an assessment; pt reluctantly agreed  Pt admitted to the unit due to severe ricardo and delusions; pt reportedly not slept in days and withdrew large sums of money from his business accounts and gave it away; however, pt is unable to tell you this himself  Pt states that he is here to see how we run; he reports that he likes taking down corrupt companies; he believes we are holding people illegally and giving them medications  Pt denies having any stressors or triggers in his life  When asked about the money he gave away he stated he owed someone money and the rest he gave away because it is [place]; he states that it is his money and he can do what he wants with it  Pt does admit to no sleep  Pt denies current or hx of SI,SA,HI,HA,AH,and VH  Pt also denies current and past hx of self harm and aggression towards others  Pt is delusional  Pt denies having access to fire arms  Pt does not feel he is a danger to himself or others  Pt states that he had one other hospitalization in November 2019 at [facility], but denies having any mental health issues  Pt reports that he only psychiatric medication he is on and needs is Adderrall  Pt denies having any mental health providers and declined any referrals as he does not feel he has mental health problems or like he needs medications    Pt reports that he is upset about the medications he has received here as they have made him sleep all night and day  Pt does not have a PCP but will take information on any clinics  Pt denies current and past trauma and abuse  Pt denies current and past legal issues  Pt denies current substance abuse although admits using Marijuana on a other day basis since age 44  Pt states he is not addicted to Marijuana and declined tx referral   Pt denies Adderall abuse and hx of other addictions or treatment  Pt is not a smoker  Pt reports that he is ; he lives alone in his business building  Pt reports that he has one daughter who is 15 and lives with her mom in [place]; he does have contact with her  Pt reports that his relationship with his ex wife is ok  Pt reports that his parents are both supportive but live in [place]  Pt reports that his sister Carly Tinoco lives in [place] and is supportive; JOYA signed  Pt's sister [facility] lives in [facility] and is supportive  Pt reports having several supports friends, family, and employees  Pt denies a family hx of MH, suicide, homicide, substance abuse, and Dementia  Pt completed the 11th grade; 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

## 2019-12-24 NOTE — PROGRESS NOTES
Patient agreed to blood work this morning, but then had requested to have it drawn in about an hour or so that he would like to rest for a little bit longer  Patient was calm and pleasant and did thank staff for care  Will attempt labs at a later time  Will pass onto the next shift  Nurses made aware

## 2019-12-24 NOTE — PROGRESS NOTES
Patient observed sleeping comfortably all evening, with no signs of distress  Will continue to monitor

## 2019-12-24 NOTE — PROGRESS NOTES
Patient visible in the dining room  Speech loud and pressured  When trying to access the patient he became louder and wished to be left alone  Patient then stated he wanted to go home within five days, he was here voluntarily and wished we would stop asking questions about his mental health because he has no issues with same  Will continue to monitor

## 2019-12-25 LAB
ALBUMIN SERPL BCP-MCNC: 4.3 G/DL (ref 3.5–5.7)
ALP SERPL-CCNC: 56 U/L (ref 40–150)
ALT SERPL W P-5'-P-CCNC: 31 U/L (ref 7–52)
ANION GAP SERPL CALCULATED.3IONS-SCNC: 8 MMOL/L (ref 4–13)
AST SERPL W P-5'-P-CCNC: 17 U/L (ref 13–39)
BILIRUB SERPL-MCNC: 0.8 MG/DL (ref 0.2–1)
BUN SERPL-MCNC: 14 MG/DL (ref 7–25)
CALCIUM SERPL-MCNC: 10 MG/DL (ref 8.6–10.5)
CHLORIDE SERPL-SCNC: 102 MMOL/L (ref 98–107)
CHOLEST SERPL-MCNC: 179 MG/DL (ref 0–200)
CO2 SERPL-SCNC: 33 MMOL/L (ref 21–31)
CREAT SERPL-MCNC: 1.18 MG/DL (ref 0.7–1.3)
ERYTHROCYTE [DISTWIDTH] IN BLOOD BY AUTOMATED COUNT: 13.4 % (ref 11.5–14.5)
GFR SERPL CREATININE-BSD FRML MDRD: 75 ML/MIN/1.73SQ M
GLUCOSE P FAST SERPL-MCNC: 79 MG/DL (ref 65–99)
GLUCOSE SERPL-MCNC: 79 MG/DL (ref 65–99)
HCT VFR BLD AUTO: 46.2 % (ref 42–47)
HDLC SERPL-MCNC: 56 MG/DL
HGB BLD-MCNC: 15.9 G/DL (ref 14–18)
LDLC SERPL CALC-MCNC: 104 MG/DL (ref 0–100)
LYMPHOCYTES # BLD AUTO: 0.25 THOUSAND/UL (ref 0.6–4.47)
LYMPHOCYTES # BLD AUTO: 5 % (ref 20–51)
MCH RBC QN AUTO: 29 PG (ref 26–34)
MCHC RBC AUTO-ENTMCNC: 34.4 G/DL (ref 31–37)
MCV RBC AUTO: 84 FL (ref 81–99)
MONOCYTES # BLD AUTO: 0.88 THOUSAND/UL (ref 0–1.22)
MONOCYTES NFR BLD AUTO: 18 % (ref 4–12)
NEUTS BAND NFR BLD MANUAL: 44 % (ref 0–8)
NEUTS SEG # BLD: 3.77 THOUSAND/UL (ref 1.81–6.82)
NEUTS SEG NFR BLD AUTO: 33 % (ref 42–75)
NONHDLC SERPL-MCNC: 123 MG/DL
PLATELET # BLD AUTO: 237 THOUSANDS/UL (ref 149–390)
PMV BLD AUTO: 7.6 FL (ref 8.6–11.7)
POTASSIUM SERPL-SCNC: 3.8 MMOL/L (ref 3.5–5.5)
PROT SERPL-MCNC: 7.1 G/DL (ref 6.4–8.9)
RBC # BLD AUTO: 5.49 MILLION/UL (ref 4.3–5.9)
SODIUM SERPL-SCNC: 143 MMOL/L (ref 134–143)
T4 SERPL-MCNC: 12.3 UG/DL (ref 4.7–13.3)
TOTAL CELLS COUNTED SPEC: 100
TRIGL SERPL-MCNC: 94 MG/DL (ref 44–166)
TSH SERPL DL<=0.05 MIU/L-ACNC: 1.13 UIU/ML (ref 0.45–5.33)
WBC # BLD AUTO: 4.9 THOUSAND/UL (ref 4.8–10.8)

## 2019-12-25 PROCEDURE — 80061 LIPID PANEL: CPT | Performed by: NURSE PRACTITIONER

## 2019-12-25 PROCEDURE — 99232 SBSQ HOSP IP/OBS MODERATE 35: CPT | Performed by: PSYCHIATRY & NEUROLOGY

## 2019-12-25 PROCEDURE — 85007 BL SMEAR W/DIFF WBC COUNT: CPT | Performed by: NURSE PRACTITIONER

## 2019-12-25 PROCEDURE — 84436 ASSAY OF TOTAL THYROXINE: CPT | Performed by: NURSE PRACTITIONER

## 2019-12-25 PROCEDURE — 85027 COMPLETE CBC AUTOMATED: CPT | Performed by: NURSE PRACTITIONER

## 2019-12-25 PROCEDURE — 84443 ASSAY THYROID STIM HORMONE: CPT | Performed by: NURSE PRACTITIONER

## 2019-12-25 PROCEDURE — 80053 COMPREHEN METABOLIC PANEL: CPT | Performed by: NURSE PRACTITIONER

## 2019-12-25 RX ADMIN — OLANZAPINE 10 MG: 10 TABLET, FILM COATED ORAL at 17:43

## 2019-12-25 RX ADMIN — OLANZAPINE 10 MG: 10 TABLET, FILM COATED ORAL at 11:30

## 2019-12-25 RX ADMIN — CLONAZEPAM 1 MG: 1 TABLET ORAL at 21:20

## 2019-12-25 NOTE — PROGRESS NOTES
Several attempts made by this nurse to wake pt up to take his AM medication  Pt refused to wake up  Non-labored breathing noted  Will try to give medication when pt wakes up

## 2019-12-25 NOTE — PROGRESS NOTES
Pt did agree to take medication with no issues  Pt WD to himself  Pt was cooperative with this nurse, but did appear to be irritated  No c/o pain  No delusional statements being made at this time  Denies hallucinations  Will continue to monitor and assess

## 2019-12-25 NOTE — PROGRESS NOTES
Pt has been WD to his room for most of the AM and afternoon shift  Pt is more subdued today  Offers no concerns at this time  Will continue to monitor and assess

## 2019-12-25 NOTE — PROGRESS NOTES
Patient has been withdrawn to room this shift  Patient appears uninterested during assessment and does not offer much  Denies SI/HI/hallucinations  Compliant with HS medications

## 2019-12-25 NOTE — PLAN OF CARE
Problem: Alteration in Thoughts and Perception  Goal: Verbalize thoughts and feelings  Description  Interventions:  - Promote a nonjudgmental and trusting relationship with the patient through active listening and therapeutic communication  - Assess patient's level of functioning, behavior and potential for risk  - Engage patient in 1 on 1 interactions  - Encourage patient to express fears, feelings, frustrations, and discuss symptoms    - [place] patient to reality, help patient recognize reality-based thinking   - Administer medications as ordered and assess for potential side effects  - Provide the patient education related to the signs and symptoms of the illness and desired effects of prescribed medications  Outcome: Progressing  Goal: Agree to be compliant with medication regime, as prescribed and report medication side effects  Description  Interventions:  - Offer appropriate PRN medication and supervise ingestion; conduct AIMS, as needed   Outcome: Progressing     Problem: DISCHARGE PLANNING - CARE MANAGEMENT  Goal: Discharge to post-acute care or home with appropriate resources  Description  INTERVENTIONS:  - Conduct assessment to determine patient/family and health care team treatment goals, and need for post-acute services based on payer coverage, community resources, and patient preferences, and barriers to discharge  - Address psychosocial, clinical, and financial barriers to discharge as identified in assessment in conjunction with the patient/family and health care team  - Arrange appropriate level of post-acute services according to patient's   needs and preference and payer coverage in collaboration with the physician and health care team  - Communicate with and update the patient/family, physician, and health care team regarding progress on the discharge plan  - Arrange appropriate transportation to post-acute venues   Outcome: Progressing     Problem: Alteration in Thoughts and Perception  Goal: Refrain from acting on delusional thinking/internal stimuli  Description  Interventions:  - Monitor patient closely, per order   - Utilize least restrictive measures   - Set reasonable limits, give positive feedback for acceptable   - Administer medications as ordered and monitor of potential side effects  Outcome: Not Progressing  Goal: Recognize dysfunctional thoughts, communicate reality-based thoughts at the time of discharge  Description  Interventions:  - Provide medication and psycho-education to assist patient in compliance and developing insight into his/her illness   Outcome: Not Progressing     Problem: NANCY  Goal: Will exhibit normal sleep and speech and no impulsivity  Description  INTERVENTIONS:  - Administer medication as ordered  - Set limits on impulsive behavior  - Make attempts to decrease external stimuli as possible  Outcome: Not Progressing

## 2019-12-26 LAB
ATRIAL RATE: 85 BPM
P AXIS: 39 DEGREES
PR INTERVAL: 152 MS
QRS AXIS: -7 DEGREES
QRSD INTERVAL: 80 MS
QT INTERVAL: 372 MS
QTC INTERVAL: 442 MS
T WAVE AXIS: 20 DEGREES
VENTRICULAR RATE: 85 BPM

## 2019-12-26 PROCEDURE — 99232 SBSQ HOSP IP/OBS MODERATE 35: CPT | Performed by: PSYCHIATRY & NEUROLOGY

## 2019-12-26 PROCEDURE — 93010 ELECTROCARDIOGRAM REPORT: CPT | Performed by: INTERNAL MEDICINE

## 2019-12-26 PROCEDURE — 93005 ELECTROCARDIOGRAM TRACING: CPT

## 2019-12-26 RX ADMIN — OLANZAPINE 10 MG: 10 TABLET, FILM COATED ORAL at 17:20

## 2019-12-26 RX ADMIN — CLONAZEPAM 1 MG: 1 TABLET ORAL at 21:49

## 2019-12-26 RX ADMIN — OLANZAPINE 10 MG: 10 TABLET, FILM COATED ORAL at 07:51

## 2019-12-26 NOTE — PROGRESS NOTES
MHT attempted to obtain an EKG 12 lead  Patient refused at this time  Will attempt at a later time  Will pass onto the next shift   RN made aware

## 2019-12-26 NOTE — PLAN OF CARE
No significant change in patient's symptoms; 303 conference will be held tomorrow to petition more tx

## 2019-12-26 NOTE — PROGRESS NOTES
Progress Note - Behavioral Health   Annamaria Alfonso 38 y o  male MRN: [id number]  Unit/Bed#: [facility] 220-02 Encounter: [id number]    Assessment/Plan   Principal Problem:    Bipolar affective disorder, current episode manic with psychotic symptoms ([facility] )  Active Problems:    Medical clearance for psychiatric admission    Amphetamine use disorder, severe (HCC)    Continue Zyprexa 10 mg twice a day and Klonopin 1 mg at bedtime    Behavior over the last 24 hours:  Unchanged  Patient reports he has no intention to hurt self or others and that he has the right to invent whatever he wants  Patient has been talking about inventions to cure heroin and also to make people able to see everything without glasses or contact lenses  Patient continues to have grandiose delusional ideas  Peer his sister he was not able to function at work for the past 2 months and stopped answering phone calls or emails and stop doing business transactions due to spending his time thinking about really and inventions to make him a trillionaire! Sleep: normal  Appetite: poor  Medication side effects: No  ROS: sedation    Mental Status Evaluation:  Appearance:  disheveled   Behavior:  guarded   Speech:  loud   Mood:  irritable   Affect:  increased in intensity and labile   Thought Process:  illogical   Thought Content:  delusions  grandiose   Perceptual Disturbances: None   Risk Potential: Potential for Aggression No   Sensorium:  person, place and time/date   Cognition:  grossly intact   Consciousness:  alert and awake    Attention: attention span and concentration were age appropriate   Insight:  limited   Judgment: limited   Gait/Station: normal gait/station   Motor Activity: no abnormal movements     Progress Toward Goals:  ongoing    Recommended Treatment: Continue with group therapy, milieu therapy and occupational therapy        Risks, benefits and possible side effects of Medications:   Risks, benefits, and possible side effects of medications explained to patient and patient verbalizes understanding  Medications: all current active meds have been reviewed  Labs: reviewed    Counseling / Coordination of Care  Total floor / unit time spent today 20 minutes  Greater than 50% of total time was spent with the patient and / or family counseling and / or coordination of care   A description of the counseling / coordination of care:

## 2019-12-26 NOTE — PROGRESS NOTES
12/26/19 0932   Team Meeting   Meeting Type Daily Rounds   Initial Conference Date 12/26/19   Patient/Family Present   Patient Present No   Patient's Family Present No     Daily Rounds Documentation    Team Members Present:   MD Vinh Duncan CRNP Gypsy Bolk, MERLY [place], [facility]    Poor sleep at night  Bizarre  Slept all day yesterday  Irritable  Barely opens his eyes for medications  Paranoid  Possible discharge tomorrow

## 2019-12-26 NOTE — PROGRESS NOTES
Patient has been withdrawn to room  Patient interaction during assessment is very minimal  Patient does not go into detail and very often answering with simple nods of the head  Denies SI/HI/hallucinations  Compliant with HS medication  Denies any needs at this time  Will continue to monitor

## 2019-12-26 NOTE — PROGRESS NOTES
Pt was seen in his room, pt presents with an irritable affect when taking medications  Pt replied to this nurse, "I take these to sleep?" pt reacted to the response as uninterested and sarcastic  Pt remains meal and medication compliant at this time  Will continue to monitor

## 2019-12-26 NOTE — PROGRESS NOTES
Progress Note - Behavioral Health   Sami Mujica 38 y o  male MRN: [id number]  Unit/Bed#: [facility] 220-02 Encounter: [id number]    Assessment/Plan   Principal Problem:    Bipolar affective disorder, current episode manic with psychotic symptoms ([facility] )  Active Problems:    Medical clearance for psychiatric admission    Amphetamine use disorder, severe ([facility] )      Behavior over the last 24 hours:  Unchanged  Patient continues to be very delusional  He firmly bel;ieves that this is a corrupt organization and we are keeping patients against their will to profit from them  He said he will report all of that and get us imprisoned  He said he will come and visit me in custodial! 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  Patient became very upset and continues to be very delusional     Sleep: hypersomnia  Appetite: poor  Medication side effects: No  ROS: no complaints    Mental Status Evaluation:  Appearance:  disheveled   Behavior:  guarded   Speech:  loud and pressured   Mood:  angry   Affect:  increased in intensity   Thought Process:  illogical and tangential   Thought Content:  delusions  grandiose   Perceptual Disturbances: None   Risk Potential: Potential for Aggression No   Sensorium:  person and place   Cognition:  grossly intact   Consciousness:  alert and awake    Attention: attention span appeared shorter than expected for age   Insight:  limited   Judgment: limited   Gait/Station: normal gait/station   Motor Activity: no abnormal movements     Progress Toward Goals: ongoing    Recommended Treatment: Continue with group therapy, milieu therapy and occupational therapy        Risks, benefits and possible side effects of Medications:   Risks, benefits, and possible side effects of medications explained to patient and patient verbalizes understanding  Medications: all current active meds have been reviewed and continue current psychiatric medications  Labs: reviewed    Counseling / Coordination of Care  Total floor / unit time spent today 20 minutes  Greater than 50% of total time was spent with the patient and / or family counseling and / or coordination of care   A description of the counseling / coordination of care:

## 2019-12-26 NOTE — SOCIAL WORK
Phone call placed to [facility] MH/DS; Katy Bray [facility] for [address] hearing needs  SW explained that we have another hearing to be added to tomorrow's list   SW provided pt information  SW informed that she needs paperwork before she can officially add them; SW informed her that she would send it over right away  FRANKO then met with pt and informed him of 303 petition and read him his rights  Pt remained calm and controlled and seemed to understand his rights  Pt also provided a copy of his 302 and 303 petition  Paperwork then faxed to [facility]

## 2019-12-27 PROCEDURE — 99232 SBSQ HOSP IP/OBS MODERATE 35: CPT | Performed by: PSYCHIATRY & NEUROLOGY

## 2019-12-27 RX ADMIN — OLANZAPINE 10 MG: 10 TABLET, FILM COATED ORAL at 09:00

## 2019-12-27 RX ADMIN — OLANZAPINE 10 MG: 10 TABLET, FILM COATED ORAL at 17:32

## 2019-12-27 RX ADMIN — CLONAZEPAM 1 MG: 1 TABLET ORAL at 21:43

## 2019-12-27 NOTE — PROGRESS NOTES
Pt has been withdrawn to his room this evening  Refused to answer assessment questions  Does not appear in distress  Will continue to monitor

## 2019-12-27 NOTE — PROGRESS NOTES
Patient stated he had a nice visit with his sister  Patient is eating his lunch at this moment  Will continue to monitor safety and behaviors every 7 minutes

## 2019-12-27 NOTE — PROGRESS NOTES
12/27/19 0931   Team Meeting   Meeting Type Daily Rounds   Initial Conference Date 12/27/19   Patient/Family Present   Patient Present No   Patient's Family Present No   Daily Rounds Documentation    Team Members Present:   MD Heather Rodriguez, RN  Wilfrid Hernandez, [facility]   Pritesh Walton, [place]    303 Hearing today

## 2019-12-27 NOTE — PROGRESS NOTES
Pt's sister brought in clothing for pt  MHT, KATHIA, inventoried the belongings and they are as follows: To remain with pt:  Shirt x1  Pants x1   Socks x1pr  Underwear x1  Sweater x1  [place] x1    Storage  Coat x1    The pants had strings in the waist   Pt gave MHT permission to cut the strings

## 2019-12-27 NOTE — PLAN OF CARE
Problem: Alteration in Thoughts and Perception  Goal: Treatment Goal: Gain control of psychotic behaviors/thinking, reduce/eliminate presenting symptoms and demonstrate improved reality functioning upon discharge  Outcome: Progressing  Goal: Verbalize thoughts and feelings  Description  Interventions:  - Promote a nonjudgmental and trusting relationship with the patient through active listening and therapeutic communication  - Assess patient's level of functioning, behavior and potential for risk  - Engage patient in 1 on 1 interactions  - Encourage patient to express fears, feelings, frustrations, and discuss symptoms    - [place] patient to reality, help patient recognize reality-based thinking   - Administer medications as ordered and assess for potential side effects  - Provide the patient education related to the signs and symptoms of the illness and desired effects of prescribed medications  Outcome: Progressing  Goal: Refrain from acting on delusional thinking/internal stimuli  Description  Interventions:  - Monitor patient closely, per order   - Utilize least restrictive measures   - Set reasonable limits, give positive feedback for acceptable   - Administer medications as ordered and monitor of potential side effects  Outcome: Progressing  Goal: Agree to be compliant with medication regime, as prescribed and report medication side effects  Description  Interventions:  - Offer appropriate PRN medication and supervise ingestion; conduct AIMS, as needed   Outcome: Progressing  Goal: Recognize dysfunctional thoughts, communicate reality-based thoughts at the time of discharge  Description  Interventions:  - Provide medication and psycho-education to assist patient in compliance and developing insight into his/her illness   Outcome: Progressing     Problem: Ineffective Coping  Goal: Participates in unit activities  Description  Interventions:  - Provide therapeutic environment   - Provide required programming - Redirect inappropriate behaviors   Outcome: Progressing     Problem: NANCY  Goal: Will exhibit normal sleep and speech and no impulsivity  Description  INTERVENTIONS:  - Administer medication as ordered  - Set limits on impulsive behavior  - Make attempts to decrease external stimuli as possible  Outcome: Progressing

## 2019-12-27 NOTE — PROGRESS NOTES
Patient out for breakfast then returned to room, pleasant and cooperative in interaction  Affect flat, denies anxiety/depression, SI/HI, hallucinations with hesitancy then stated "I feel better since I'm here"  Remains medication compliant and on 7" checks for safety and behaviors

## 2019-12-27 NOTE — SOCIAL WORK
303 hearing held today on the unit  In attendance: [facility] , Attteo Carty 's Office, Dr Pati Shea Psychiatrist, and [facility] Management  Pt did participate in today's proceedings; pt was mostly appropriate; however, judgment and insight continue to be significantly impaired     Some delusional statements were made  303 recommendation was upheld for up to 20 days of inpatient psychiatric treatment  303 will  on 20

## 2019-12-27 NOTE — PROGRESS NOTES
Patient ou tin the milieu social with peers and staff  Patient calm and cooperative  Brightens with conversation  Patient refuse afternoon snack but attended group  Patient requested a schematic drawing of a [place] transmission  Patient was happy to receive information  No behavioral issues  No complaints or concerns  Will continue to monitor safety and behaviors every 7 minutes

## 2019-12-28 PROCEDURE — 99232 SBSQ HOSP IP/OBS MODERATE 35: CPT | Performed by: PSYCHIATRY & NEUROLOGY

## 2019-12-28 RX ORDER — OLANZAPINE 10 MG/1
10 TABLET ORAL
Status: DISCONTINUED | OUTPATIENT
Start: 2019-12-28 | End: 2019-12-29

## 2019-12-28 RX ADMIN — OLANZAPINE 10 MG: 10 TABLET, FILM COATED ORAL at 21:17

## 2019-12-28 RX ADMIN — OLANZAPINE 10 MG: 10 TABLET, FILM COATED ORAL at 08:53

## 2019-12-28 RX ADMIN — HYDROXYZINE HYDROCHLORIDE 50 MG: 25 TABLET ORAL at 18:04

## 2019-12-28 NOTE — PROGRESS NOTES
Pt has been visible on the unit at times  Pt cooperative and pleasant with this nurse  Less grandiose  Offers no concerns or complaints at this time  Will continue to monitor and assess

## 2019-12-28 NOTE — PROGRESS NOTES
Progress Note - Behavioral Health   Jeana Granados 38 y o  male MRN: [id number]  Unit/Bed#: [facility] 220-02 Encounter: [id number]    Assessment/Plan   Principal Problem:    Bipolar affective disorder, current episode manic with psychotic symptoms ([facility] [address] )  Active Problems:    Medical clearance for psychiatric admission    Amphetamine use disorder, severe ([facility] [address] )      Behavior over the last 24 hours:  Unchanged  Patient had a lengthy court hearing and the  did ask for his sister to come into the hearing  Eventually the  did commit the patient to up to 20 more days  Patient continued to make delusional statement of grandiose nature patient is not as agitated as before but continues to have the grandiose delusions which impairs his judgment  Sleep: insomnia  Appetite: poor  Medication side effects: No  ROS: no complaints    Mental Status Evaluation:  Appearance:  casually dressed   Behavior:  uncooperative   Speech:  loud   Mood:  irritable   Affect:  mood-congruent   Thought Process:  illogical   Thought Content:  delusions  grandiose   Perceptual Disturbances: None   Risk Potential: Suicidal Ideations none   Sensorium:  person and place   Cognition:  grossly intact   Consciousness:  alert and awake    Attention: attention span appeared shorter than expected for age   Insight:  limited   Judgment: limited   Gait/Station: normal gait/station   Motor Activity: no abnormal movements     Progress Toward Goals: ongoing    Recommended Treatment: Continue with group therapy, milieu therapy and occupational therapy  Risks, benefits and possible side effects of Medications:   Risks, benefits, and possible side effects of medications explained to patient and patient verbalizes understanding  Medications: all current active meds have been reviewed  Labs: reviewed    Counseling / Coordination of Care  Total floor / unit time spent today 30 minutes   Greater than 50% of total time was spent with the patient and / or family counseling and / or coordination of care   A description of the counseling / coordination of care:

## 2019-12-28 NOTE — PLAN OF CARE
Problem: Alteration in Thoughts and Perception  Goal: Verbalize thoughts and feelings  Description  Interventions:  - Promote a nonjudgmental and trusting relationship with the patient through active listening and therapeutic communication  - Assess patient's level of functioning, behavior and potential for risk  - Engage patient in 1 on 1 interactions  - Encourage patient to express fears, feelings, frustrations, and discuss symptoms    - [place] patient to reality, help patient recognize reality-based thinking   - Administer medications as ordered and assess for potential side effects  - Provide the patient education related to the signs and symptoms of the illness and desired effects of prescribed medications  Outcome: Progressing  Goal: Refrain from acting on delusional thinking/internal stimuli  Description  Interventions:  - Monitor patient closely, per order   - Utilize least restrictive measures   - Set reasonable limits, give positive feedback for acceptable   - Administer medications as ordered and monitor of potential side effects  Outcome: Progressing  Goal: Agree to be compliant with medication regime, as prescribed and report medication side effects  Description  Interventions:  - Offer appropriate PRN medication and supervise ingestion; conduct AIMS, as needed   Outcome: Progressing     Problem: NANCY  Goal: Will exhibit normal sleep and speech and no impulsivity  Description  INTERVENTIONS:  - Administer medication as ordered  - Set limits on impulsive behavior  - Make attempts to decrease external stimuli as possible  Outcome: Progressing     Problem: DISCHARGE PLANNING - CARE MANAGEMENT  Goal: Discharge to post-acute care or home with appropriate resources  Description  INTERVENTIONS:  - Conduct assessment to determine patient/family and health care team treatment goals, and need for post-acute services based on payer coverage, community resources, and patient preferences, and barriers to discharge  - Address psychosocial, clinical, and financial barriers to discharge as identified in assessment in conjunction with the patient/family and health care team  - Arrange appropriate level of post-acute services according to patient's   needs and preference and payer coverage in collaboration with the physician and health care team  - Communicate with and update the patient/family, physician, and health care team regarding progress on the discharge plan  - Arrange appropriate transportation to post-acute venues   Outcome: Progressing     Problem: Alteration in Thoughts and Perception  Goal: Recognize dysfunctional thoughts, communicate reality-based thoughts at the time of discharge  Description  Interventions:  - Provide medication and psycho-education to assist patient in compliance and developing insight into his/her illness   Outcome: Not Progressing

## 2019-12-28 NOTE — PROGRESS NOTES
Progress Note - Behavioral Health   Jono Pulido 38 y o  male MRN: [id number]  Unit/Bed#: [facility] 220-02 Encounter: [id number]    Assessment/Plan   Principal Problem:    Bipolar affective disorder, current episode manic with psychotic symptoms ([facility] )  Active Problems:    Medical clearance for psychiatric admission    Amphetamine use disorder, severe ([facility] [address] )      Behavior over the last 24 hours:  Patient requested to go back on Adderall  It was explained to him that Adderall can make his psychosis and ricardo worse and he should stay away from it  Patient seemed a bit upset but overall did comply and did not seem to be very angry about it  Sleep: insomnia  Appetite: poor  Medication side effects: No  ROS: no complaints    Mental Status Evaluation:  Appearance:  bearded   Behavior:  normal   Speech:  normal pitch   Mood:  constricted   Affect:  constricted   Thought Process:  concrete   Thought Content:  Grandiose delusions   Perceptual Disturbances: None   Risk Potential: ricardo   Sensorium:  person and place   Cognition:  grossly intact   Consciousness:  alert and awake    Attention: attention span and concentration were age appropriate   Insight:  fair   Judgment: fair   Gait/Station: normal gait/station   Motor Activity: no abnormal movements     Progress Toward Goals: ongoing    Recommended Treatment: Continue with group therapy, milieu therapy and occupational therapy  Risks, benefits and possible side effects of Medications:   Risks, benefits, and possible side effects of medications explained to patient and patient verbalizes understanding  Medications: all current active meds have been reviewed  Labs: reviewed    Counseling / Coordination of Care  Total floor / unit time spent today 20 minutes  Greater than 50% of total time was spent with the patient and / or family counseling and / or coordination of care   A description of the counseling / coordination of care:

## 2019-12-28 NOTE — PROGRESS NOTES
Pt presents as flat and depressed  No c/o pain  Pt was compliant with AM medication and was out for breakfast   Pt less manic and not as hyperverbal or pressured  No delusional statements being made at this time  Pt's thoughts were organized during conversation  Pt has been visible on the unit and will socialize with select peers  No agitation or irritability being displayed at this time  Polite and pleasant to this nurse  Denies hallucinations  Denies SI/HI  Will continue to monitor and assess

## 2019-12-28 NOTE — PROGRESS NOTES
Pt has been more withdrawn this evening  Bizarre delusional statements at times  Polite  Able to make needs known  Will continue to monitor

## 2019-12-29 PROCEDURE — 99232 SBSQ HOSP IP/OBS MODERATE 35: CPT | Performed by: PSYCHIATRY & NEUROLOGY

## 2019-12-29 RX ORDER — OLANZAPINE 10 MG/1
20 TABLET ORAL
Status: DISCONTINUED | OUTPATIENT
Start: 2019-12-29 | End: 2019-12-31 | Stop reason: HOSPADM

## 2019-12-29 RX ADMIN — TRAZODONE HYDROCHLORIDE 50 MG: 50 TABLET ORAL at 23:31

## 2019-12-29 RX ADMIN — HYDROXYZINE HYDROCHLORIDE 25 MG: 25 TABLET ORAL at 03:53

## 2019-12-29 RX ADMIN — HYDROXYZINE HYDROCHLORIDE 50 MG: 25 TABLET ORAL at 10:55

## 2019-12-29 RX ADMIN — OLANZAPINE 20 MG: 10 TABLET, FILM COATED ORAL at 21:43

## 2019-12-29 NOTE — PROGRESS NOTES
Pt presents with some anxiety, but is pleasant and bright  No c/o pain  Pt remains compliant with meds and meals  Pt reported having some anxiety and states that he never had anxiety prior to coming here and wants to know why he has it here  Explained to pt that being taken out of your normal environment can cause someone to feel anxious  Or being on a behavioral health unit can be causing some of the anxiety  Pt verbally understands, but requested some Atarax  PRN Atarax given @ 1055  Pt thanked staff for being so kind and was very appreciative  Pt was able to recognize that he was not in the right frame of mind when he was first admitted to the unit  Pt was organized during conversation and no delusional statements were made  Pt theatrical and some grandiosity while speaking  Pt has been visible on the unit and will socialize with select peers  Attending groups  Denies hallucinations  Will continue to monitor and assess

## 2019-12-29 NOTE — PROGRESS NOTES
Patient out with c/o anxiety and inability to sleep  States he has been trying to fall back to sleep for the past two hours  Recommended prn atarax vs trazodone due to the late hour  Patient agreed    Prn atarax 25mg provided

## 2019-12-29 NOTE — PLAN OF CARE
Problem: Alteration in Thoughts and Perception  Goal: Verbalize thoughts and feelings  Description  Interventions:  - Promote a nonjudgmental and trusting relationship with the patient through active listening and therapeutic communication  - Assess patient's level of functioning, behavior and potential for risk  - Engage patient in 1 on 1 interactions  - Encourage patient to express fears, feelings, frustrations, and discuss symptoms    - [place] patient to reality, help patient recognize reality-based thinking   - Administer medications as ordered and assess for potential side effects  - Provide the patient education related to the signs and symptoms of the illness and desired effects of prescribed medications  Outcome: Progressing  Goal: Refrain from acting on delusional thinking/internal stimuli  Description  Interventions:  - Monitor patient closely, per order   - Utilize least restrictive measures   - Set reasonable limits, give positive feedback for acceptable   - Administer medications as ordered and monitor of potential side effects  Outcome: Progressing  Goal: Agree to be compliant with medication regime, as prescribed and report medication side effects  Description  Interventions:  - Offer appropriate PRN medication and supervise ingestion; conduct AIMS, as needed   Outcome: Progressing     Problem: NANCY  Goal: Will exhibit normal sleep and speech and no impulsivity  Description  INTERVENTIONS:  - Administer medication as ordered  - Set limits on impulsive behavior  - Make attempts to decrease external stimuli as possible  Outcome: Progressing     Problem: DISCHARGE PLANNING - CARE MANAGEMENT  Goal: Discharge to post-acute care or home with appropriate resources  Description  INTERVENTIONS:  - Conduct assessment to determine patient/family and health care team treatment goals, and need for post-acute services based on payer coverage, community resources, and patient preferences, and barriers to discharge  - Address psychosocial, clinical, and financial barriers to discharge as identified in assessment in conjunction with the patient/family and health care team  - Arrange appropriate level of post-acute services according to patient's   needs and preference and payer coverage in collaboration with the physician and health care team  - Communicate with and update the patient/family, physician, and health care team regarding progress on the discharge plan  - Arrange appropriate transportation to post-acute venues   Outcome: Progressing     Problem: Alteration in Thoughts and Perception  Goal: Recognize dysfunctional thoughts, communicate reality-based thoughts at the time of discharge  Description  Interventions:  - Provide medication and psycho-education to assist patient in compliance and developing insight into his/her illness   Outcome: Not Progressing

## 2019-12-29 NOTE — PROGRESS NOTES
Pt visible on the unit  Hyperverbal, flight of ideas  Education given on what flight of ideas are, pt became bizarrely happy and stated that he is glad to know what that means and thought he was crazy  Pt appears religiously preoccupied at times and states if you want money god will give you money  Pt appears euphoric at times  Will continue to monitor

## 2019-12-30 PROCEDURE — 99232 SBSQ HOSP IP/OBS MODERATE 35: CPT | Performed by: NURSE PRACTITIONER

## 2019-12-30 RX ORDER — ALBUTEROL SULFATE 90 UG/1
2 AEROSOL, METERED RESPIRATORY (INHALATION) EVERY 4 HOURS PRN
Status: DISCONTINUED | OUTPATIENT
Start: 2019-12-30 | End: 2019-12-31 | Stop reason: HOSPADM

## 2019-12-30 RX ADMIN — HYDROXYZINE HYDROCHLORIDE 50 MG: 25 TABLET ORAL at 01:38

## 2019-12-30 RX ADMIN — HYDROXYZINE HYDROCHLORIDE 50 MG: 25 TABLET ORAL at 10:18

## 2019-12-30 RX ADMIN — OLANZAPINE 20 MG: 10 TABLET, FILM COATED ORAL at 21:45

## 2019-12-30 RX ADMIN — ALBUTEROL SULFATE 2 PUFF: 90 AEROSOL, METERED RESPIRATORY (INHALATION) at 12:45

## 2019-12-30 RX ADMIN — TRAZODONE HYDROCHLORIDE 50 MG: 50 TABLET ORAL at 22:15

## 2019-12-30 RX ADMIN — HYDROXYZINE HYDROCHLORIDE 50 MG: 25 TABLET ORAL at 14:18

## 2019-12-30 RX ADMIN — HYDROXYZINE HYDROCHLORIDE 50 MG: 25 TABLET ORAL at 18:14

## 2019-12-30 RX ADMIN — ALBUTEROL SULFATE 2 PUFF: 90 AEROSOL, METERED RESPIRATORY (INHALATION) at 18:15

## 2019-12-30 RX ADMIN — HYDROXYZINE HYDROCHLORIDE 50 MG: 25 TABLET ORAL at 22:15

## 2019-12-30 NOTE — SOCIAL WORK
SW met with pt to discuss discharge for tomorrow  Pt confirmed that he is going to his sister's home and plans on staying there  SW informed his that she will be providing him with the [facility] information for that region  Pt asked to also receive [facility] information for [place] as well  Pt is bright, pleasant, and seems much improved today  He has no questions or concerns to report  Phone call placed to [place] [address]; spoke to [place]  Explained pt's needs and started the intake process  SW asked to call back with pt to provide additional information and to schedule him an intake appointment  [facility] reports that they can assist him with getting set up with a , therapist, and psychiatrist       Phone call placed to pt's sister Smith Cox confirmed that pt will be living with her although she is concerned he won't take his medications and 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

## 2019-12-30 NOTE — PROGRESS NOTES
Patient has been visible on unit  Pleasant, bright, polite and appreciative  Patient showered and shaved  Discussed with patient the increased dose of Zyprexa at HS  Patient verbalizes understanding and compliant with taking  Cooperative  Patient did report an episode of anxiety earlier in the day and that he was unable to identify a trigger  Patient reports it to have been manageable and did not need a PRN  Patient does report he gets anxiety from time to time outside of the hospital but that he is able to identify the cause after thinking about the events  Denies SI/HI/hallucinations  Will continue to monitor

## 2019-12-30 NOTE — PROGRESS NOTES
Pt is pleasant and bright  No c/o pain  Pt did report having some anxiety and requested PRN Atarax  Atarax 50 mg given @ 1018  Pt also requesting an inhaler and reports that he has asthma  Pt did report this to provider  No wheezing noted, lungs clear  Pt has been visible in the community and is socializing with peers  Attending groups  Pt feels ready for D/C tomorrow  No delusional statements being made at this time  Less manic  Pt cooperative and polite to staff  Behaviors appropriate  Calm and controlled  Denies hallucinations  Will continue to monitor and assess

## 2019-12-30 NOTE — PROGRESS NOTES
12/30/19 0924   Team Meeting   Meeting Type Daily Rounds   Initial Conference Date 12/30/19   Patient/Family Present   Patient Present No   Patient's Family Present No     Daily Rounds Documentation    Team Members Present:   Dr Essence Pak, SINAI Maher, RN  Tiesha Whelan, LSW   [facility]    Taking his Zyprexa  Pleasant  Doing much better  Discharge to his sister's home tomorrow

## 2019-12-30 NOTE — DISCHARGE INSTR - APPOINTMENTS
The treatment team recommends ongoing medication management, case management services, individual therapy, and substance abuse treatment  You declined a referral for substance abuse treatment  Because you do not have medical insurance at this time, a referral has been made on your behalf to [facility] ([address], [address] Phone: [phone] Fax: [phone]) for case management services that will assist you with getting set up with a therapist, psychiatrist, and Primary Care Physician; they will also assist you with applying for insurance  Your intake appointment is scheduled for Wednesday, January 15, 2020 at 2:00PM with Chayo; please bring your photo ID and hospital discharge papers  Your summary of care will be faxed to this provider  You identified that you do not have a Primary Care Physician at this time; the county will assist you with obtaining one  If you change your mind about substance abuse treatment please contact [facility] Drug and Alcohol Commission at [phone]  If you return to [facility] you can go to [facility] ([address] Phone: [phone]) for the same services that [facility] offers    They have walk-in appointments Monday, Tuesday, and Thursday from 9:00AM to 11:30AM and Wednesday from 9:00AM to 11:30AM and 1:00PM to 3:00PM

## 2019-12-30 NOTE — PROGRESS NOTES
Patient had difficulty falling asleep this evening  PRN Trazodone administered and ineffective  Patient did appear anxious and was agreeable to PRN Atarax, which appears effective as patient was able to fall asleep shortly after administration  Will continue to monitor

## 2019-12-30 NOTE — DISCHARGE INSTR - OTHER ORDERS
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 [phone], call 911, or go to the nearest emergency department for assistance      [facility] [address]:  [phone]  Peer Hotline is also available M-F between the hours of 6-10pm, at [phone]

## 2019-12-30 NOTE — PLAN OF CARE
Patient will discharge to his sister's home tomorrow; he has no insurance and has been scheduled with [facility] for 1/15/20

## 2019-12-30 NOTE — PROGRESS NOTES
Progress Note - Behavioral Health   Soraya Kay 38 y o  male MRN: [id number]  Unit/Bed#: [facility] 220-02 Encounter: [id number]    Assessment/Plan   Principal Problem:    Bipolar affective disorder, current episode manic with psychotic symptoms ([facility] [address] )  Active Problems:    Medical clearance for psychiatric admission    Amphetamine use disorder, severe ([facility] [address] )      Behavior over the last 24 hours:  Improved  Patient has been showing some improvement specially in his mood  He is not as delusional or hostile as before and despite having some grandiose overvalued ideas close to delusions but he is able to discuss the without becoming defensive or angry  Discussed changing the Zyprexa to 1 time dose at bedtime and he seemed agreeable with the plan    Sleep: hypersomnia  Appetite: normal  Medication side effects: No  ROS: no complaints    Mental Status Evaluation:  Appearance:  casually dressed   Behavior:  guarded   Speech:  pressured   Mood:  euphoric   Affect:  increased in intensity   Thought Process:  flight of ideas   Thought Content:  delusions  grandiose   Perceptual Disturbances: None   Risk Potential: Suicidal Ideations none   Sensorium:  person and place   Cognition:  grossly intact   Consciousness:  alert and awake    Attention: attention span appeared shorter than expected for age   Insight:  limited   Judgment: limited   Gait/Station: normal gait/station   Motor Activity: no abnormal movements     Progress Toward Goals: reviewed    Recommended Treatment: Continue with group therapy, milieu therapy and occupational therapy  Risks, benefits and possible side effects of Medications:   Risks, benefits, and possible side effects of medications explained to patient and patient verbalizes understanding  Medications: all current active meds have been reviewed  Labs: reviewed    Counseling / Coordination of Care  Total floor / unit time spent today 20 minutes   Greater than 50% of total time was spent with the patient and / or family counseling and / or coordination of care   A description of the counseling / coordination of care:

## 2019-12-30 NOTE — PLAN OF CARE
Problem: Alteration in Thoughts and Perception  Goal: Verbalize thoughts and feelings  Description  Interventions:  - Promote a nonjudgmental and trusting relationship with the patient through active listening and therapeutic communication  - Assess patient's level of functioning, behavior and potential for risk  - Engage patient in 1 on 1 interactions  - Encourage patient to express fears, feelings, frustrations, and discuss symptoms    - [place] patient to reality, help patient recognize reality-based thinking   - Administer medications as ordered and assess for potential side effects  - Provide the patient education related to the signs and symptoms of the illness and desired effects of prescribed medications  Outcome: Progressing  Goal: Refrain from acting on delusional thinking/internal stimuli  Description  Interventions:  - Monitor patient closely, per order   - Utilize least restrictive measures   - Set reasonable limits, give positive feedback for acceptable   - Administer medications as ordered and monitor of potential side effects  Outcome: Progressing  Goal: Agree to be compliant with medication regime, as prescribed and report medication side effects  Description  Interventions:  - Offer appropriate PRN medication and supervise ingestion; conduct AIMS, as needed   Outcome: Progressing     Problem: NANCY  Goal: Will exhibit normal sleep and speech and no impulsivity  Description  INTERVENTIONS:  - Administer medication as ordered  - Set limits on impulsive behavior  - Make attempts to decrease external stimuli as possible  Outcome: Progressing     Problem: DISCHARGE PLANNING - CARE MANAGEMENT  Goal: Discharge to post-acute care or home with appropriate resources  Description  INTERVENTIONS:  - Conduct assessment to determine patient/family and health care team treatment goals, and need for post-acute services based on payer coverage, community resources, and patient preferences, and barriers to discharge  - Address psychosocial, clinical, and financial barriers to discharge as identified in assessment in conjunction with the patient/family and health care team  - Arrange appropriate level of post-acute services according to patient's   needs and preference and payer coverage in collaboration with the physician and health care team  - Communicate with and update the patient/family, physician, and health care team regarding progress on the discharge plan  - Arrange appropriate transportation to post-acute venues   Outcome: Progressing     Problem: Alteration in Thoughts and Perception  Goal: Recognize dysfunctional thoughts, communicate reality-based thoughts at the time of discharge  Description  Interventions:  - Provide medication and psycho-education to assist patient in compliance and developing insight into his/her illness   Outcome: Not Progressing

## 2019-12-30 NOTE — PROGRESS NOTES
Progress Note - Behavioral Health     Annamaria Alfonso 38 y o  male MRN: [id number]   Unit/Bed#: [facility] 220-02 Encounter: [id number]    Behavior over the last 24 hours:      Nash Pina was seen for an inpatient follow-up psychiatric visit this date  He was pleasant cooperative during conversation  His mood lability has greatly decreased  He did not discuss any delusional content  His behavior has been controlled on the unit  He is taking his medications as prescribed without side effects  He is looking forward to being discharged tomorrow  ROS: no complaints, all other systems are negative    Mental Status Evaluation:    Appearance:  casually dressed, dressed appropriately   Behavior:  cooperative, calm   Speech:  normal rate and volume   Mood:  normal   Affect:  normal range and intensity   Thought Process:  organized, coherent, linear   Associations: intact associations   Thought Content:  normal, no overt delusions   Perceptual Disturbances: none   Risk Potential: Suicidal ideation - None  Homicidal ideation - None  Potential for aggression - No   Sensorium:  oriented to person, place and time/date   Memory:  recent and remote memory grossly intact   Consciousness:  alert and awake   Attention: attention span and concentration appear shorter than expected for age   Insight:  improving   Judgment: improving   Gait/Station: normal gait/station, normal balance   Motor Activity: no abnormal movements     Vital signs in last 24 hours:    Temp:  [97 6 °F (36 4 °C)-98 4 °F (36 9 °C)] 97 6 °F (36 4 °C)  HR:  [] 74  Resp:  [16] 16  BP: (110-125)/(68-74) 110/68    Laboratory results:  I have personally reviewed all pertinent laboratory/tests results      Progress Toward Goals: improved    Assessment/Plan   Principal Problem:    Bipolar affective disorder, current episode manic with psychotic symptoms [facility] )  Active Problems:    Medical clearance for psychiatric admission    Amphetamine use disorder, severe [facility])    Recommended Treatment:     Continue current medication as prescribed  Continue to monitor  Discharge disposition and planning are ongoing  All current active medications have been reviewed  Encourage group therapy, milieu therapy and occupational therapy  Behavioral Health checks every 7 minutes      Current Facility-Administered Medications:  albuterol 2 puff Inhalation Q4H PRN Shirley Turkics, CRNP   aluminum-magnesium hydroxide-simethicone 20 mL Oral Q4H PRN Cheryl Suero MD   benztropine 1 mg Intramuscular Q6H PRN Shirley MARKS Lodics, CRNP   benztropine 1 mg Oral Q6H PRN Shirley N Lodics, CRNP   haloperidol 5 mg Oral Q6H PRN Shirley Turkics, CRNP   haloperidol lactate 5 mg Intramuscular Q6H PRN Shirley Varela, CRNP   hydrOXYzine HCL 25 mg Oral Q6H PRN Cheryl Suero MD   hydrOXYzine HCL 50 mg Oral Q4H PRN Shirley Varela, CRNP   ibuprofen 400 mg Oral Q8H PRN Cheryl Suero MD   ibuprofen 600 mg Oral Q8H PRN Cheryl Suero MD   ibuprofen 800 mg Oral Q8H PRN Cheryl Suero MD   LORazepam 2 mg Intramuscular Q6H PRN Cheryl Suero MD   magnesium hydroxide 30 mL Oral Daily PRN Cheryl Suero MD   OLANZapine 10 mg Intramuscular Q3H PRN Shirley Varela, CRNP   OLANZapine 20 mg Oral HS Makenzie Bolden MD   risperiDONE 1 mg Oral BID PRN Cehryl Suero MD   traZODone 50 mg Oral HS PRN Cheryl Suero MD       Risks / Benefits of Treatment:    Risks, benefits, and possible side effects of medications explained to patient and patient verbalizes understanding and agreement for treatment  Counseling / Coordination of Care:      Patient's progress discussed with staff in treatment team meeting  Medications, treatment progress and treatment plan reviewed with patient

## 2019-12-31 VITALS
SYSTOLIC BLOOD PRESSURE: 126 MMHG | OXYGEN SATURATION: 99 % | RESPIRATION RATE: 16 BRPM | DIASTOLIC BLOOD PRESSURE: 71 MMHG | WEIGHT: 165 LBS | HEIGHT: 67 IN | BODY MASS INDEX: 25.9 KG/M2 | HEART RATE: 74 BPM | TEMPERATURE: 98.1 F

## 2019-12-31 PROCEDURE — 99239 HOSP IP/OBS DSCHRG MGMT >30: CPT | Performed by: NURSE PRACTITIONER

## 2019-12-31 RX ORDER — OLANZAPINE 20 MG/1
20 TABLET ORAL
Qty: 30 TABLET | Refills: 0 | Status: SHIPPED | OUTPATIENT
Start: 2019-12-31 | End: 2020-01-30

## 2019-12-31 RX ADMIN — RISPERIDONE 1 MG: 1 TABLET, ORALLY DISINTEGRATING ORAL at 00:03

## 2019-12-31 RX ADMIN — HYDROXYZINE HYDROCHLORIDE 50 MG: 25 TABLET ORAL at 02:51

## 2019-12-31 RX ADMIN — HYDROXYZINE HYDROCHLORIDE 50 MG: 25 TABLET ORAL at 09:18

## 2019-12-31 NOTE — NURSING NOTE
Discharge instructions and medication reviewed with patient, patient verbalized understanding of all instructions and medication  Patient discharged, ambulatory, with sister to home and all belongings

## 2019-12-31 NOTE — PROGRESS NOTES
Patient alert, awake, pleasant and cooperative in interaction  Patient denies anxiety/depression, SI/HI, hallucinations, no overt delusions, behaviors controlled  Remains on 7" checks for safety and behaviors

## 2019-12-31 NOTE — PROGRESS NOTES
Pt to be discharged with the following belongings:    Hat x1    Coat x2    Shoes x1 pair    Shirt x2    Sweater x1    Pants x2    Socks x2 pair    Underwear x1    [place] x1

## 2019-12-31 NOTE — BH TRANSITION RECORD
Contact Information: If you have any questions, concerns, pended studies, tests and/or procedures, or emergencies regarding your inpatient behavioral health visit  Please contact [facility] [phone] and ask to speak to a , nurse or physician  A contact is available 24 hours/ 7 days a week at this number  Summary of Procedures Performed During your Stay:  Below is a list of major procedures performed during your hospital stay and a summary of results:  - No major procedures performed  Pending Studies (From admission, onward)    None        If studies are pending at discharge, follow up with your PCP and/or referring provider

## 2019-12-31 NOTE — DISCHARGE SUMMARY
Discharge Summary - [address] [facility] 38 y o  male MRN: [id number]  Unit/Bed#: -02 Encounter: [id number]     Admission Date: 12/23/2019         Discharge Date: 12/31/2019 10:50 AM    Attending Psychiatrist: Annmarie Ludwig MD    Reason for Admission/HPI:     Principal Problem:    Bipolar affective disorder, current episode manic with psychotic symptoms ([facility] )  Active Problems:    Medical clearance for psychiatric admission    Amphetamine use disorder, severe ([facility] )    Roger Schulz is a 81-year-old male patient admitted on an involuntary 302 commitment basis to the adult behavioral health unit due to acute ricardo and erratic behavior  Per emergency room evaluation completed by Dr Ely Lundy:    "Patient is a 81-year-old male history of ADHD, anxiety, bipolar disorder as well as psychosis requiring previous admission to inpatient mental health therapy 1 month ago, obtained from prior medical records  Patient provides minimal history stating that he would like us to speak to Cresencio Stewart, his co-worker a  I spoke with his co-worker, she states the patient has not been sleeping well, she thinks he is homeless, 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 his desk which she thinks she has been sleeping on  She also concerned that he has been abusing Adderall, he overtly states he has not been taking his antipsychotic medications  She notes that the patient is having increasingly bizarre behavior, and drawing and scrubbing on pieces of paper, sterile the walls  States that he has been stating he can make are sticking go 200 miles/gallon, and telling her to hold piece of paper at awkward angles to see missing gears for cars at a not present,  as well as stating that he can manipulate the temperature of water by touching the pipe for a sink  Patient denies suicidal homicidal ideations to me  Denies auditory or visual hallucinations as well    However, when asked again about his thoughts about harming others, he states that he thinks that other people are resistant to him and he will no longer visits them and so he is now spoken with God and will become nothing  Per initial psychiatric evaluation completed by Dr Kavitha Mancuso:    "Patient is a 38 y o  male on his 2nd psychiatric hospitalization within a month coming on a 302 due to very manic and psychotic behaviors  Patient presented to the ER with an employee of his business due to her retic and a regular behavior  In the ER patient kept talking about grandiose ideas and inventions that will make him a trilionnaire  Patient seemed very hyperverbal with pressured speech and flight of ideas  Patient reported that he discovered the cure for heroin addiction which is a Narcan and kept going on tangential subjects  Per his sister the patient has been dysfunctional for the past 2 months as he sits in his office scribbling things and coming up with irrational ideas  She reports that he was not able to take care of his business due to his current state  Sister denies any knowledge of drug use issues are mental health issues in the family  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"    Guero Madden has a psychiatric history of ADHD, bipolar disorder, and anxiety  He has been hospitalized on an inpatient basis at least once before in November of 2019 at [facility]  He has been taking Adderall 30 mg 3 times daily prescribed by his family physician  He has no current outpatient psychiatric provider  Hospital Course:     Guero Madden was admitted to the [address] [facility] unit after being medically cleared  Once on the unit, he was seen by medical doctor for physical examination    He was placed on 7 minutes behavioral health checks for patient safety  He was encouraged to attend both group and occupational therapy  Psychiatric medications were initiated and titrated to appropriate dosages  Before any medications were administered, risk versus benefit was discussed  Jimenez Pham agreed to take medication as prescribed  Initially after admission, he was overtly manic and psychotic  He was hyperverbal, delusional, and grandiose  Zyprexa 5 mg q h s  Was initiated and was eventually titrated up to 20 mg q h s     After beginning Zyprexa and becoming adjusted to the therapeutic milieu of the unit, his mood began to stabilize  His appetite and sleep returned to normal   His ricardo resolved on he was no longer experiencing symptoms of psychosis  He was able to interact in the milieu with both staff and peers in an appropriate manner  His insight and judgment improved  His mood stabilized without complications  He had no aggressive behaviors either physically or verbally while on the unit  By the end of his hospitalization, he was feeling much better  Because he was doing much better the Psychiatric Care Team felt it would be both safe and appropriate to discharge him to care on an outpatient basis  As he has no insurance, an intake appointment was scheduled for him to receive outpatient psychiatric services through [facility]  He also agreed to continue taking his psychiatric medication once discharged  He plans to live with his sister when released from the hospital   Both he and his sister agreed to this discharge plan  On the day of discharge, Jimenez Pham denies any suicidal or homicidal ideation as well as any auditory or visual hallucinations  He was exhibiting no symptoms of psychosis or ricardo  His mood and behavior were stable, and he was looking forward to going home      Mental Status at time of Discharge:     Appearance:  casually dressed   Behavior:  normal   Speech:  normal pitch and normal volume   Mood:  normal Affect:  normal   Thought Process:  normal   Thought Content:  normal   Perceptual Disturbances: None   Risk Potential: Patient denies any suicidal or homicidal ideations  Sensorium:  person, place, time/date and situation   Cognition:  grossly intact   Consciousness:  alert and awake    Attention: attention span and concentration were age appropriate   Insight:  fair   Judgment: fair   Gait/Station: normal gait/station and normal balance   Motor Activity: no abnormal movements     Admission Diagnosis:Schizophrenia ([place] [address] ) [F20 9]    Discharge Diagnosis:   Principal Problem:    Bipolar affective disorder, current episode manic with psychotic symptoms ([place] [address] )  Active Problems:    Medical clearance for psychiatric admission    Amphetamine use disorder, severe ([place] [address] )  Resolved Problems:    * No resolved hospital problems   *        Lab results:  Admission on 12/23/2019, Discharged on 12/31/2019   Component Date Value    WBC 12/25/2019 4 90     RBC 12/25/2019 5 49     Hemoglobin 12/25/2019 15 9     Hematocrit 12/25/2019 46 2     MCV 12/25/2019 84     MCH 12/25/2019 29 0     MCHC 12/25/2019 34 4     RDW 12/25/2019 13 4     MPV 12/25/2019 7 6*    Platelets 33/04/8979 237     Sodium 12/25/2019 143     Potassium 12/25/2019 3 8     Chloride 12/25/2019 102     CO2 12/25/2019 33*    ANION GAP 12/25/2019 8     BUN 12/25/2019 14     Creatinine 12/25/2019 1 18     Glucose 12/25/2019 79     Glucose, Fasting 12/25/2019 79     Calcium 12/25/2019 10 0     AST 12/25/2019 17     ALT 12/25/2019 31     Alkaline Phosphatase 12/25/2019 56     Total Protein 12/25/2019 7 1     Albumin 12/25/2019 4 3     Total Bilirubin 12/25/2019 0 80     eGFR 12/25/2019 75     Cholesterol 12/25/2019 179     Triglycerides 12/25/2019 94     HDL, Direct 12/25/2019 56     LDL Calculated 12/25/2019 104*    Non-HDL-Chol (CHOL-HDL) 12/25/2019 123     T4 TOTAL 12/25/2019 12 3     TSH 3RD GENERATON 12/25/2019 1 130     Segmented % 12/25/2019 33*    Bands % 12/25/2019 44*    Lymphocytes % 12/25/2019 5*    Monocytes % 12/25/2019 18*    Neutrophils Absolute 12/25/2019 3 77     Lymphocytes Absolute 12/25/2019 0 25*    Monocytes Absolute 12/25/2019 0 88     Total Counted 12/25/2019 100     Ventricular Rate 12/26/2019 85     Atrial Rate 12/26/2019 85     VT Interval 12/26/2019 152     QRSD Interval 12/26/2019 80     QT Interval 12/26/2019 372     QTC Interval 12/26/2019 442     P Axis 12/26/2019 39     QRS Axis 12/26/2019 -7     T Wave Axis 12/26/2019 20        Discharge Medications:  Discharge Medication List as of 12/31/2019 10:44 AM         Discharge Medication List as of 12/31/2019 10:44 AM      STOP taking these medications       Amphetamine-Dextroamphetamine (ADDERALL PO) Comments:   Reason for Stopping:              Discharge Medication List as of 12/31/2019 10:44 AM      CONTINUE these medications which have CHANGED    Details   OLANZapine (ZyPREXA) 20 MG tablet Take 1 tablet (20 mg total) by mouth daily at bedtime, Starting Tue 12/31/2019, Until Thu 1/30/2020, Print            Discharge Medication List as of 12/31/2019 10:44 AM           Discharge instructions/Information to patient and family:   See after visit summary for information provided to patient and family  Provisions for Follow-Up Care:  See after visit summary for information related to follow-up care and any pertinent home health orders  Discharge Statement   I spent 35 minutes discharging the patient  This time was spent on the day of discharge  I had direct contact with the patient on the day of discharge  Additional documentation is required if more than 30 minutes were spent on discharge  Importance of psychiatric follow-up and medication adherence was discussed  Importance of utilizing support system once discharged was also discussed  Kathleen Montero verbalized understanding and was in agreement

## 2019-12-31 NOTE — PROGRESS NOTES
Patient has been visible on unit  Pleasant  Looking forward to discharge tomorrow  Patient was shortly fixated on "the bill" he would be receiving for being put here  Patient remains calm and controlled  Pleasant with peers  Makes needs known appropriately  Will continue to monitor

## 2019-12-31 NOTE — SOCIAL WORK
Pt's sister Quyen Louis arrived to the unit 2 hours prior to discharge requesting a letter that pt can provide to court  Quyen Louis explained that pt had a custody hearing yesterday that he missed  SW completed letter and met with Quyen Louis in the hallway; FRANKO informed her that the letter is in pt's discharge packet  Quyen Missy informed that she will likely have to wait for pt as she is very early for discharge; she was understandable  Quyen Louis also requests that the doctor stress to pt that he needs to go home with her; FRANKO informed her that we can't force him but that we can stress how important it is; FRANKO informed provider

## 2019-12-31 NOTE — PROGRESS NOTES
PRN Atarax given per request due to anxiety over upcoming discharge, support provided  Remains on 7" checks for safety and behaviors

## 2019-12-31 NOTE — DISCHARGE INSTRUCTIONS
Bipolar Disorder   WHAT YOU NEED TO KNOW:   What is bipolar disorder? Bipolar disorder is a long-term chemical imbalance that causes rapid changes in mood and behavior  High moods are called ricardo  Low moods are called depression  Sometimes you will feel manic and sometimes you will feel depressed  You can have alternating episodes of ricardo and depression  This is called a mixed bipolar state  What increases my risk for bipolar disorder? Bipolar disorder is caused by a chemical imbalance  You are more likely to have bipolar disorder if someone in your family has a mood disorder  Stress, and drug or alcohol abuse are the most common triggers for bipolar disorder symptoms  What are the signs and symptoms of ricardo? · Being easily distracted or agitated, or focusing all your attention on a goal    · Insomnia (trouble sleeping) or not needing as much sleep as usual    · Inflated self-esteem or belief in abilities    · Racing thoughts that may not make sense or be understood by others    · Speech that is faster than usual, or you talk more than usual    · Increased thoughts about sex    · Happy and care free, with a sudden change to anger or irritability    · Hallucinations that cause you to see and hear things that are not really there  What are the signs and symptoms depression? · Anger, worry, anxiousness, or irritability    · Lack of energy    · Sadness or emptiness    · Crying for long periods    · Low self-esteem or sense of worthlessness    · Negative thoughts or not caring about anything    · Too much or too little sleep  How is bipolar disorder treated? There is no cure for bipolar disorder, but medicines may be used to control your mood swings  You may need to see a therapist or psychiatrist regularly for counseling  You may need to go into the hospital for tests and treatment  Where can I find support and more information?    · [address] [facility], Public Information & Communication Branch  [address], [address], [address]  [place] [address]   Phone: [phone]  Phone: [phone]  Web Address: Susan tn  · Depression and [address] ([address])  [address] [facility], [address] [place] [address] , [address]  Phone: [phone]  Web Address: [employer]   Call 911 if:   · You think about hurting yourself or someone else  When should I contact my healthcare provider? · You are having trouble managing your bipolar disorder  · You cannot sleep, or are sleeping all the time  · You cannot eat, or are eating more than usual     · You feel dizzy or your stomach is upset  · You cannot make it to your next appointment  · You have questions or concerns about your condition or care  CARE AGREEMENT:   You have the right to help plan your care  Learn about your health condition and how it may be treated  Discuss treatment options with your caregivers to decide what care you want to receive  You always have the right to refuse treatment  The above information is an  only  It is not intended as medical advice for individual conditions or treatments  Talk to your doctor, nurse or pharmacist before following any medical regimen to see if it is safe and effective for you  © 2017 [address] [facility] Information is for End User's use only and may not be sold, redistributed or otherwise used for commercial purposes  All illustrations and images included in CareNotes® are the copyrighted property of A D A M , [employer]  or Endy Goodwin  Olanzapine (By mouth)   Olanzapine ([id number])  Treats psychotic mental disorders, such as schizophrenia or bipolar disorder (manic-depressive illness)  Brand Name(s): ZyPREXA, ZyPREXA Zydis   There may be other brand names for this medicine  When This Medicine Should Not Be Used:    This medicine is not right for everyone  Do not use it if you had an allergic reaction to olanzapine  How to Use This Medicine:   Tablet, Dissolving Tablet  · Take your medicine as directed  Your dose may need to be changed several times to find what works best for you  · Make sure your hands are dry before you handle the disintegrating tablet  Peel back the foil from the blister pack, then remove the tablet  Do not push the tablet through the foil  Place the tablet in your mouth  After it has melted, swallow or take a drink of water  · This medicine should come with a Medication Guide  Ask your pharmacist for a copy if you do not have one  · Missed dose: Take a dose as soon as you remember  If it is almost time for your next dose, wait until then and take a regular dose  Do not take extra medicine to make up for a missed dose  · Store the medicine in a closed container at room temperature, away from heat, moisture, and direct light  Keep the disintegrating tablet in the original package until you are ready to use it  Drugs and Foods to Avoid:   Ask your doctor or pharmacist before using any other medicine, including over-the-counter medicines, vitamins, and herbal products  · You must be careful if you are also using other medicine that might cause similar side effects as olanzapine  Make sure your doctor knows about all other medicines you are using  · Some medicines can affect how olanzapine works  Tell your doctor if you are using any of the following:  ¨ Carbamazepine, diazepam, fluoxetine, fluvoxamine, levodopa, omeprazole, or rifampin  ¨ Blood pressure medicine  c  · Tell your doctor if you use anything else that makes you sleepy  Some examples are allergy medicine, narcotic pain medicine, and alcohol  · Do not drink alcohol while you are using this medicine  · Tell your doctor if you smoke tobacco  You might need a different amount of this medicine if you smoke    Warnings While Using This Medicine:   · Tell your doctor if you are pregnant, or if you have kidney disease, liver disease, diabetes, glaucoma, prostate problems, or a history of breast cancer, neuroleptic malignant syndrome (NMS), seizures, or severe constipation  Tell your doctor if you have any kind of heart or circulation problems, including low blood pressure, heart failure, heart rhythm problems, or a history of a heart attack or stroke  Tell your doctor if you have a condition called phenylketonuria  · Do not breastfeed while you are using this medicine  · For some children, teenagers, and young adults, this medicine may increase mental or emotional problems  This may lead to thoughts of suicide and violence  Talk with your doctor right away if you have any thoughts or behavior changes that concern you  Tell your doctor if you or anyone in your family has a history of bipolar disorder or suicide attempts  · This medicine may cause the following problems:  ¨ Neuroleptic malignant syndrome (a nerve and muscle problem)  ¨ Drug reaction with eosinophilia and systemic symptoms (DRESS)  ¨ High blood sugar, cholesterol, or triglyceride levels  ¨ Tardive dyskinesia (a muscle problem that may become permanent)  · This medicine may make you dizzy or drowsy, or may cause trouble with thinking or controlling body movements, which may lead to falls, fractures or other injuries  Do not drive or do anything that could be dangerous until you know how this medicine affects you  You may also feel lightheaded when getting up suddenly from a lying or sitting position, so stand up slowly  · This medicine may make you bleed, bruise, or get infections more easily  Take precautions to prevent illness and injury  Wash your hands often  · This medicine may make it more difficult for your body to cool down  Be careful to not become overheated during exercise or hot weather, because you could have heat stroke    · Your doctor will do lab tests at regular visits to check on the effects of this medicine  Keep all appointments  · Keep all medicine out of the reach of children  Never share your medicine with anyone  Possible Side Effects While Using This Medicine:   Call your doctor right away if you notice any of these side effects:  · Allergic reaction: Itching or hives, swelling in your face or hands, swelling or tingling in your mouth or throat, chest tightness, trouble breathing  · Eye pain, trouble seeing  · Feeling very thirsty or hungry, change in how much or how often you urinate  · Fever, chills, cough, sore throat, body aches  · Jerky muscle movement you cannot control (often in your face, tongue, or jaw)  · Lightheadedness, dizziness, fainting  · Seizures or tremors  · Sweating, confusion, uneven heartbeat, muscle stiffness  · Swollen breasts, or liquid discharge from your nipples (men or women)  · Swollen, painful, or tender lymph glands in neck, armpit, or groin  · Trouble breathing or swallowing  · Unusual behavior, thoughts of hurting yourself or others  · Unusual bleeding, bruising, or weakness  If you notice these less serious side effects, talk with your doctor:   · Constipation, upset stomach  · Dry mouth  · Headache, tiredness  · Sleepiness or unusual drowsiness  · Weight gain  If you notice other side effects that you think are caused by this medicine, tell your doctor  Call your doctor for medical advice about side effects  You may report side effects to FDA at 3-651-FDA-0805  © 2017 [address] [employer] Information is for End User's use only and may not be sold, redistributed or otherwise used for commercial purposes  The above information is an  only  It is not intended as medical advice for individual conditions or treatments  Talk to your doctor, nurse or pharmacist before following any medical regimen to see if it is safe and effective for you  Metabolic Syndrome X   WHAT YOU NEED TO KNOW:   What is metabolic syndrome?   Metabolic syndrome is a group of medical conditions that can increase your risk for heart disease, stroke, or type 2 diabetes  You may have metabolic syndrome if you have at least 3 of the following medical conditions:  · High triglycerides (a type of fat in your blood)    · Low HDL cholesterol (good cholesterol)    · High blood pressure    · High blood sugar levels    · Extra abdominal fat  What increases my risk for metabolic syndrome? The exact cause of metabolic syndrome is not known  Your risk for metabolic syndrome increases if you have insulin resistance  Insulin is a hormone that helps your body take sugar out of your blood and use it for energy  Insulin resistance means your pancreas keeps making insulin but your body cannot use it correctly  Your risk for metabolic syndrome also increases as you age, if you are overweight or obese, or you do not exercise  What are the signs and symptoms of metabolic syndrome? Most people with metabolic syndrome do not have any signs or symptoms  You may have more thirst or hunger than usual, urinate more often, or have blurred vision or headaches  How is metabolic syndrome diagnosed? Your healthcare provider will examine you and ask about other medical conditions you may have  He may ask if you have any family members with metabolic syndrome, diabetes, obesity, or heart disease  · Blood tests: These are used to check your glucose and cholesterol levels  · Oral glucose tolerance test:  Your blood sugar level is tested after you fast for 8 hours, then again after you are given a glucose drink  The test measures how high your blood sugar level rises from the glucose drink  How is metabolic syndrome treated? · Cholesterol medicine: This type of medicine is given to help decrease (lower) the amount of cholesterol (fat) in your blood  Cholesterol medicine works best if you also exercise and eat a healthy diet that is low in certain kinds of fats   Some cholesterol medicines may cause liver problems  You may need to have blood taken for tests while using this medicine  · Blood pressure medicine: This is given to lower your blood pressure  A controlled blood pressure helps protect your organs, such as your heart, lungs, brain, and kidneys  Take your blood pressure medicine exactly as directed  · Hypoglycemic medicine: This medicine may be given to decrease the amount of sugar in your blood  Hypoglycemic medicine helps your body move the sugar to your cells, where it is needed for energy  What are the risks of metabolic syndrome? If untreated, metabolic syndrome increases your risk of heart disease, stroke, and diabetes  These conditions lead to life-threatening illness  How can I manage metabolic syndrome? · Maintain a healthy weight:  Weight loss helps lower cholesterol, triglycerides, blood pressure, and blood glucose levels  It can also raise HDL (good cholesterol)  Ask your healthcare provider how much you should weigh  Ask him to help you create a weight loss plan if you are overweight  · Eat a variety of healthy foods:  Healthy foods include fruits, vegetables, whole-grain breads, low-fat dairy products, beans, lean meats, and fish  Eat foods that are low in fat and sodium (salt)  A dietitian can help you plan healthy meals  · Exercise:  Ask your healthcare provider to help you create an exercise plan  Exercise can help lower your blood pressure, cholesterol, and blood sugar levels  Exercise can also help raise your HDL level and help you to lose weight  Get at least 30 minutes of exercise, 5 days each week  · Check your blood pressure as directed: You may be asked to keep a record of your blood pressure and bring it with you to follow-up visits  Ask your healthcare provider what your blood pressure should be and how to check it  · Limit alcohol:  Women should limit alcohol to 1 drink a day  Men should limit alcohol to 2 drinks a day   A drink of alcohol is 12 ounces of beer, 5 ounces of wine, or 1½ ounces of liquor  · Do not smoke: If you smoke, it is never too late to quit  Smoking further increases your risk for heart disease and stroke  Ask your healthcare provider for information if you need help quitting  When should I contact my healthcare provider? · You have more thirst or hunger than usual      · You urinate more frequently  · You have blurred vision  · You have questions or concerns about your condition or care  When should I seek immediate care or call 911? · Your blood pressure is higher than your healthcare provider told you it should be  · You have chest pain or discomfort that spreads to your arms, jaw, or back  · You have a severe headache or dizziness  · You have trouble thinking, speaking, or understanding others  CARE AGREEMENT:   You have the right to help plan your care  Learn about your health condition and how it may be treated  Discuss treatment options with your caregivers to decide what care you want to receive  You always have the right to refuse treatment  The above information is an  only  It is not intended as medical advice for individual conditions or treatments  Talk to your doctor, nurse or pharmacist before following any medical regimen to see if it is safe and effective for you  © 2017 26[address] [facility] Information is for End User's use only and may not be sold, redistributed or otherwise used for commercial purposes  All illustrations and images included in CareNotes® are the copyrighted property of A D A M , Inc  or Endy Goodwin  Cigarette Smoking and Your Health   WHAT YOU NEED TO KNOW:   What are the risks to my health if I smoke tobacco?  Nicotine and other chemicals found in tobacco damage every cell in your body  Even if you are a light smoker, you have an increased risk for cancer, heart disease, and lung disease   If you are pregnant or have diabetes, smoking increases your risk for complications  What are the benefits to my health if I stop smoking? · You decrease respiratory symptoms such as coughing, wheezing, and shortness of breath  · You reduce your risk for cancers of the lung, mouth, throat, kidney, bladder, pancreas, stomach, and cervix  If you already have cancer, you increase the benefits of chemotherapy  You also reduce your risk for cancer returning or a second cancer from developing  · You reduce your risk for heart disease, blood clots, heart attack, and stroke  · You reduce your risk for lung infections, and diseases such as pneumonia, asthma, chronic bronchitis, and emphysema  · Your circulation improves  More oxygen can be delivered to your body  If you have diabetes, you lower your risk for complications, such as kidney, artery, and eye diseases  You also lower your risk for nerve damage  Nerve damage can lead to amputations, poor vision, and blindness  · You improve your body's ability to heal and to fight infections  What are the health benefits to others if I stop smoking? Tobacco is harmful to nonsmokers who breathe in your secondhand smoke  The following are ways the health of others around you may improve when you stop smoking:  · You lower the risks for lung cancer and heart disease in nonsmoking adults  · If you are pregnant, you lower the risk for miscarriage, early delivery, low birth weight, and stillbirth  You also lower your baby's risk for SIDS, obesity, developmental delay, and neurobehavioral problems, such as ADHD  · If you have children, you lower their risk for ear infections, colds, pneumonia, bronchitis, and asthma  Where can I find more information and support to stop smoking? · [employer]  Phone: 9- 502 - [phone]  Web Address: www [employer]  CARE AGREEMENT:   You have the right to help plan your care  Learn about your health condition and how it may be treated   Discuss treatment options with your caregivers to decide what care you want to receive  You always have the right to refuse treatment  The above information is an  only  It is not intended as medical advice for individual conditions or treatments  Talk to your doctor, nurse or pharmacist before following any medical regimen to see if it is safe and effective for you  © 2017 [address] [employer] Information is for End User's use only and may not be sold, redistributed or otherwise used for commercial purposes  All illustrations and images included in CareNotes® are the copyrighted property of [employer]  or Endy Goodwin  How to Stop Smoking   WHAT YOU NEED TO KNOW:   You will improve your health and the health of others around you if you stop smoking  Your risk for heart and lung disease, cancer, stroke, heart attack, and vision problems will also decrease  You can benefit from quitting no matter how long you have smoked  DISCHARGE INSTRUCTIONS:   Prepare to stop smoking:  Nicotine is a highly addictive drug found in cigarettes  Withdrawal symptoms can happen when you stop smoking and make it hard to quit  These include anxiety, depression, irritability, trouble sleeping, and increased appetite  You increase your chances of success if you prepare to quit  · Set a quit date  Anushka Goldman a date that is within the next 2 weeks  Do not pick a day that you think may be stressful or busy  Write down the day or [place] it on your calender  · Tell friends and family that you plan to quit  Explain that you may have withdrawal symptoms when you try to quit  Ask them to support you  They may be able to encourage you and help reduce your stress to make it easier for you to quit  · Make a list of your reasons for quitting  Put the list somewhere you will see it every day, such as your refrigerator  You can look at the list when you have a craving       · Remove all tobacco and nicotine products from your home, car, and workplace  Also, remove anything else that will tempt you to smoke, such as lighters, matches, or ashtrays  Clean your car, home, and places at work that smell like smoke  The smell of smoke can trigger a craving  · Identify triggers that make you want to smoke  This may include activities, feelings, or people  Also write down 1 way you can deal with each of your triggers  For example, if you want to smoke as soon as you wake up, plan another activity during this time, such as exercise  · Make a plan for how you will quit  Learn about the tools that can help you quit, such as medicine, counseling, or nicotine replacement therapy  Choose at least 2 options to help you quit  Tools to help you stop smoking:   · Counseling  from a trained healthcare provider can provide you with support and skills to quit smoking  The provider will also teach you to manage your withdrawal symptoms and cravings  You may receive counseling from one counselor, in group therapy, or through phone therapy called a quit line  · Nicotine replacement therapy (NRT)  such as nicotine patches, gum, or lozenges may help reduce your nicotine cravings  You may get these without a doctor's order  Do not use e-cigarettes or smokeless tobacco in place of cigarettes or to help you quit  They still contain nicotine  · Prescription medicines  such as nasal sprays or nicotine inhalers may help reduce your withdrawal symptoms  Other medicines may also be used to reduce your urge to smoke  Ask your healthcare provider about these medicines  You may need to start certain medicines 2 weeks before your quit date for them to work well  · Hypnosis  is a practice that helps guide you through thoughts and feelings  Hypnosis may help decrease your cravings and make you more willing to quit  · Acupuncture therapy  uses very thin needles to balance energy channels in the body   This is thought to help decrease cravings and symptoms of nicotine withdrawal      · Support groups  let you talk to others who are trying to quit or have already quit  It may be helpful to speak with others about how they quit  Manage your cravings:   · Avoid situations, people, and places that tempt you to smoke  Go to nonsmoking places, such as libraries or restaurants  Understand what tempts you and try to avoid these things  · Keep your hands busy  Hold things such as a stress ball or pen  · Put candy or toothpicks in your mouth  Keep lollipops, sugarless gum, or toothpicks with you at all times  · Do not have alcohol or caffeine  These drinks may tempt you to smoke  Drink healthy liquids such as water or juice instead  · Reward yourself when you resist your cravings  Rewards will motivate you and help you stay positive  · Do an activity that distracts you from your craving  Examples include going for a walk, exercising, or cleaning  Prevent weight gain after you quit:  You may gain a few pounds after you quit smoking  It is healthier for you to gain a few pounds than to continue to smoke  The following can help you prevent weight gain:  · Eat healthy foods  These include fruits, vegetables, whole-grain breads, low-fat dairy products, beans, lean meats, and fish  Eat healthy snacks, such as low-fat yogurt, if you get hungry between meals  · Drink water before, during, and between meals  This will make your stomach feel full and help prevent you from overeating  Ask your healthcare provider how much liquid to drink each day and which liquids are best for you  · Exercise  Take a walk or do some kind of exercise every day  Ask your healthcare provider what exercise is right for you  This may help reduce your cravings and reduce stress  For support and more information:   · [employer]oke[employer]  gov  Phone: 5- 919 - 177-6863  Web Address: www smokefree  gov  © 2017 26[address] Prabhakar Valentine Information is for End User's use only and may not be sold, redistributed or otherwise used for commercial purposes  All illustrations and images included in CareNotes® are the copyrighted property of A D A M , Inc  or Endy Goodwin  The above information is an  only  It is not intended as medical advice for individual conditions or treatments  Talk to your doctor, nurse or pharmacist before following any medical regimen to see if it is safe and effective for you  Lung Cancer Screening   WHAT YOU NEED TO KNOW:   What is lung cancer screening? Lung cancer screening is a test done every year to find lung cancer early  Screening is different from diagnosis because screening is used before you have any signs or symptoms  Screening may be helpful if you are or were a heavy smoker, or if you smoked for many years  This is because smoking increases your risk for lung cancer  Lung cancer screening has benefits and risks  Talk with your healthcare provider about the benefits and risks to help you decide if lung cancer screening is right for you  What do I need to know about lung cancer? · Lung cancer cells can form a tumor in your lungs and can spread to other areas of your body  This cancer is often found after it has spread  By this time, it is more difficult to treat  Treatment may include surgery to remove lung tissue that contains cancer cells  · Lung cancer is the leading cause of cancer deaths  Each year, about 220,000 people find out that they have lung cancer  About 150,000 people die each year from lung cancer  · Half of the people who have lung cancer are over the age of 79  The most common age of people who die from lung cancer is 67  · Talk to your healthcare provider if you think you have signs or symptoms of lung cancer  Examples include chest pain, a cough that does not go away, coughing up blood, wheezing, hoarseness, and neck swelling  Am I a good candidate for lung cancer screening?   You may be able to have lung cancer screening if the following are true:  · You are between 54and [de-identified]years old  · You have never had lung cancer  · You do not currently have any signs or symptoms of lung cancer  · You currently smoke, or you quit less than 15 years ago  You also are or were a heavy smoker (30 pack-years or more)  · You do not have any other serious conditions that may affect how long you live  · You are willing to have surgery if screening shows signs of lung cancer  · You are willing to have screening every year until you have not smoked for 15 years or reach 80years of age  Screening may also stop if you develop another health condition or you are no longer willing to have treatment  What are pack-years? Pack-years are the number of cigarette packs you smoked multiplied by the number of years you smoked  A heavy smoker has 30 pack-years or more  Examples of 30 pack-years are 1 pack of cigarettes smoked each day for 30 years, or 2 packs each day for 15 years  Your healthcare provider can help you calculate your pack-years  How is lung cancer screening done? Lung cancer screening is done with a low-dose CT scan (LDCT)  A CT uses an x-ray and a computer to give detailed pictures of your lungs  You will lie on a table for this test  A low amount of radiation from the x-ray machine is used to take pictures of your lungs  This test only takes a few minutes  You will be asked to hold your breath for about 6 seconds while the pictures are taken  What are the benefits of lung cancer screening? · LDCT can find some kinds of lung cancer early  This means it can be treated with less invasive surgery, or less lung tissue may need to be removed  · The scan is not invasive or painful, and takes only a short amount of time  · LDCT does not leave radiation in your body after the scan is done      · LDCT can lower your risk of death from lung cancer by 16% to 20% because the cancer is found early  Your risk for death from any cause is lowered by 6 7%  This is because the scan may show signs of problems other than lung cancer that need to be treated  What are the risks of lung cancer screening? · LDCT exposes you to a small amount of radiation that can increase your risk for cancer  The amount is less than is given during a mammogram  It is about the same amount you get from the sun in a year  Because you will need to be screened every year, your total radiation exposure over time is increased  · The test can give a false-positive result  This means that screening shows you have lung cancer, but follow-up tests show that you do not  You may get more tests or even treatments that are not needed  It can also be stressful to think you have lung cancer when you do not  The risk for a false-positive result is about 23%  · The test can give a false-negative result  This means that the test does not find signs of cancer, but you later develop signs and symptoms and need treatment  A false-negative result can keep you from getting treatment as early as possible  The risk for a false-negative result is about 4%  · Screening may lead to over-diagnosis if tumors are found that are not life-threatening  Some forms of cancer grow quickly and need to be treated  Other forms grow slowly and may not be life-threatening in your lifetime  · Screening may lead to over-treatment  This means you get treatment that is not necessary  About 10% of people with lung cancer receive treatment that was not needed  What happens after I have lung cancer screening? You will meet with your healthcare provider to go over the results of your screening  You may need more tests to diagnose anything that showed up on the screening test   Lung cancer screening needs to be done each year  Even if your result shows you do not have lung cancer, it is important to continue getting screened each year   This is the best way to find a new cancer, at the earliest possible stage  What questions should I ask my healthcare provider to help me make a decision about screening? · How high is my risk for lung cancer? · What are my pack-years? · Will I be able to help create my treatment plan if screening shows I have lung cancer? · Will my insurance cover screening? · Where is the screening done? · Do I need to do anything to get ready to have screening? · When and how do I get the results of my screening? What do I need to think about before I decide to have lung cancer screening? · Benefits and risks of lung cancer screening:      ¨ Do I understand the benefits and risks of lung cancer screening with LDCT? How concerned am I about the risks? Do I think the benefits are greater than the risks? ¨ One of the main risks is that the radiation I get during screening can cause cancer  Do I understand how high the risk is? How worried am I about the risk? · Possibility of finding lung cancer: How will I feel if I find out I have lung cancer? Am I willing to get the treatment I might need? · Possibility of false results:  I might get a false-positive or false-negative result  How will I feel if the test results are wrong? · Need for annual screening:  Screening needs to be done every year  Am I willing to have screening until I am a nonsmoker for 15 years, am 80, or develop another condition? Screening will also stop if I am no longer willing to have treatment  How do I feel about the need for surgery or other treatment for lung cancer? Will I be comfortable with my decision not to have treatment, and to stop having screening? What do I need to know about quitting smoking? If you currently smoke, it is very important that you quit  If you already quit smoking, it is important that you do not start smoking again  You will also need to avoid secondhand smoke from others   Nicotine and other chemicals in cigarettes and cigars increase your risk for cancer  Your risk for lung cancer gets lower each year that you do not smoke  Even if you get lung cancer screening every year, it is still important not to smoke  Ask your healthcare provider for information if you currently smoke and need help to quit  You can find support and more information here:  · Smokefree  gov  Phone: 9- 459 - 444-5655  Web Address: [employer] smokefree  gov  Where can I find support and more information about lung cancer? · American [employer]  [address] [place] [address],[address]  [address]  Phone: [facility] [address]  Phone: 4- 642 - [phone]  Web Address: [employer]  CARE AGREEMENT:   You have the right to help plan your care  Learn about your health condition and how it may be treated  Discuss treatment options with your caregivers to decide what care you want to receive  You always have the right to refuse treatment  The above information is an  only  It is not intended as medical advice for individual conditions or treatments  Talk to your doctor, nurse or pharmacist before following any medical regimen to see if it is safe and effective for you  © 2017 [employer] [employer] Information is for End User's use only and may not be sold, redistributed or otherwise used for commercial purposes  All illustrations and images included in CareNotes® are the copyrighted property of A D A M , [employer]  or Endy Goodwin

## 2019-12-31 NOTE — PROGRESS NOTES
S/P PRN Atarax, patient states he feels better, less anxious  Remains on 7" checks for safety and behaviors

## 2019-12-31 NOTE — PROGRESS NOTES
12/31/19 0925   Team Meeting   Meeting Type Daily Rounds   Initial Conference Date 12/31/19   Patient/Family Present   Patient Present No   Patient's Family Present No   Daily Rounds Documentation    Team Members Present:   MD Edgard Robbins, RN  Vallorie Simmonds, [facility], [facility] today at 12 pm   Needs letter for court issue  Will be seen at [address]

## 2019-12-31 NOTE — PROGRESS NOTES
Patient requested multiple PRN medications throughout the night  Slept poorly  Able to get short naps in intermittently  Patient anxiously awaiting discharge  Will continue to monitor

## 2019-12-31 NOTE — PLAN OF CARE
Problem: Alteration in Thoughts and Perception  Goal: Treatment Goal: Gain control of psychotic behaviors/thinking, reduce/eliminate presenting symptoms and demonstrate improved reality functioning upon discharge  Outcome: Adequate for Discharge  Goal: Verbalize thoughts and feelings  Description  Interventions:  - Promote a nonjudgmental and trusting relationship with the patient through active listening and therapeutic communication  - Assess patient's level of functioning, behavior and potential for risk  - Engage patient in 1 on 1 interactions  - Encourage patient to express fears, feelings, frustrations, and discuss symptoms    - [place] patient to reality, help patient recognize reality-based thinking   - Administer medications as ordered and assess for potential side effects  - Provide the patient education related to the signs and symptoms of the illness and desired effects of prescribed medications  Outcome: Adequate for Discharge  Goal: Refrain from acting on delusional thinking/internal stimuli  Description  Interventions:  - Monitor patient closely, per order   - Utilize least restrictive measures   - Set reasonable limits, give positive feedback for acceptable   - Administer medications as ordered and monitor of potential side effects  Outcome: Adequate for Discharge  Goal: Agree to be compliant with medication regime, as prescribed and report medication side effects  Description  Interventions:  - Offer appropriate PRN medication and supervise ingestion; conduct AIMS, as needed   Outcome: Adequate for Discharge  Goal: Recognize dysfunctional thoughts, communicate reality-based thoughts at the time of discharge  Description  Interventions:  - Provide medication and psycho-education to assist patient in compliance and developing insight into his/her illness   Outcome: Adequate for Discharge     Problem: Ineffective Coping  Goal: Participates in unit activities  Description  Interventions:  - Provide therapeutic environment   - Provide required programming   - Redirect inappropriate behaviors   Outcome: Adequate for Discharge     Problem: NANCY  Goal: Will exhibit normal sleep and speech and no impulsivity  Description  INTERVENTIONS:  - Administer medication as ordered  - Set limits on impulsive behavior  - Make attempts to decrease external stimuli as possible  Outcome: Adequate for Discharge

## 2020-06-30 ENCOUNTER — HOSPITAL ENCOUNTER (EMERGENCY)
Facility: HOSPITAL | Age: 45
Discharge: HOME | End: 2020-06-30
Attending: EMERGENCY MEDICINE
Payer: COMMERCIAL

## 2020-06-30 VITALS
BODY MASS INDEX: 28.19 KG/M2 | SYSTOLIC BLOOD PRESSURE: 167 MMHG | OXYGEN SATURATION: 99 % | HEART RATE: 106 BPM | TEMPERATURE: 97.8 F | WEIGHT: 180 LBS | DIASTOLIC BLOOD PRESSURE: 110 MMHG | RESPIRATION RATE: 18 BRPM

## 2020-06-30 DIAGNOSIS — Z00.8 ENCOUNTER FOR PSYCHOLOGICAL EVALUATION: Primary | ICD-10-CM

## 2020-06-30 DIAGNOSIS — F19.10 POLYSUBSTANCE ABUSE (HCC): ICD-10-CM

## 2020-06-30 LAB
ALBUMIN SERPL BCP-MCNC: 4.3 G/DL (ref 3.5–5)
ALP SERPL-CCNC: 70 U/L (ref 46–116)
ALT SERPL W P-5'-P-CCNC: 72 U/L (ref 12–78)
AMPHETAMINES SERPL QL SCN: POSITIVE
ANION GAP SERPL CALCULATED.3IONS-SCNC: 10 MMOL/L (ref 4–13)
AST SERPL W P-5'-P-CCNC: 26 U/L (ref 5–45)
BARBITURATES UR QL: NEGATIVE
BASOPHILS # BLD AUTO: 0.05 THOUSANDS/ΜL (ref 0–0.1)
BASOPHILS NFR BLD AUTO: 1 % (ref 0–1)
BENZODIAZ UR QL: NEGATIVE
BILIRUB SERPL-MCNC: 0.5 MG/DL (ref 0.2–1)
BILIRUB UR QL STRIP: NEGATIVE
BUN SERPL-MCNC: 6 MG/DL (ref 5–25)
CALCIUM SERPL-MCNC: 9.2 MG/DL (ref 8.3–10.1)
CHLORIDE SERPL-SCNC: 101 MMOL/L (ref 100–108)
CLARITY UR: CLEAR
CO2 SERPL-SCNC: 30 MMOL/L (ref 21–32)
COCAINE UR QL: NEGATIVE
COLOR UR: NORMAL
CREAT SERPL-MCNC: 1.08 MG/DL (ref 0.6–1.3)
EOSINOPHIL # BLD AUTO: 0.14 THOUSAND/ΜL (ref 0–0.61)
EOSINOPHIL NFR BLD AUTO: 2 % (ref 0–6)
ERYTHROCYTE [DISTWIDTH] IN BLOOD BY AUTOMATED COUNT: 12.6 % (ref 11.6–15.1)
ETHANOL SERPL-MCNC: <3 MG/DL (ref 0–3)
GFR SERPL CREATININE-BSD FRML MDRD: 84 ML/MIN/1.73SQ M
GLUCOSE SERPL-MCNC: 106 MG/DL (ref 65–140)
GLUCOSE SERPL-MCNC: 109 MG/DL (ref 65–140)
GLUCOSE UR STRIP-MCNC: NEGATIVE MG/DL
HCT VFR BLD AUTO: 40.6 % (ref 36.5–49.3)
HGB BLD-MCNC: 14.7 G/DL (ref 12–17)
HGB UR QL STRIP.AUTO: NEGATIVE
IMM GRANULOCYTES # BLD AUTO: 0.03 THOUSAND/UL (ref 0–0.2)
IMM GRANULOCYTES NFR BLD AUTO: 0 % (ref 0–2)
KETONES UR STRIP-MCNC: NEGATIVE MG/DL
LEUKOCYTE ESTERASE UR QL STRIP: NEGATIVE
LITHIUM SERPL-SCNC: 0.8 MMOL/L (ref 0.5–1)
LYMPHOCYTES # BLD AUTO: 1.36 THOUSANDS/ΜL (ref 0.6–4.47)
LYMPHOCYTES NFR BLD AUTO: 19 % (ref 14–44)
MCH RBC QN AUTO: 29.7 PG (ref 26.8–34.3)
MCHC RBC AUTO-ENTMCNC: 36.2 G/DL (ref 31.4–37.4)
MCV RBC AUTO: 82 FL (ref 82–98)
METHADONE UR QL: NEGATIVE
MONOCYTES # BLD AUTO: 0.5 THOUSAND/ΜL (ref 0.17–1.22)
MONOCYTES NFR BLD AUTO: 7 % (ref 4–12)
NEUTROPHILS # BLD AUTO: 5.02 THOUSANDS/ΜL (ref 1.85–7.62)
NEUTS SEG NFR BLD AUTO: 71 % (ref 43–75)
NITRITE UR QL STRIP: NEGATIVE
NRBC BLD AUTO-RTO: 0 /100 WBCS
OPIATES UR QL SCN: NEGATIVE
OXYCODONE+OXYMORPHONE UR QL SCN: NEGATIVE
PCP UR QL: NEGATIVE
PH UR STRIP.AUTO: 7 [PH]
PLATELET # BLD AUTO: 371 THOUSANDS/UL (ref 149–390)
PMV BLD AUTO: 9.4 FL (ref 8.9–12.7)
POTASSIUM SERPL-SCNC: 3 MMOL/L (ref 3.5–5.3)
PROT SERPL-MCNC: 7.8 G/DL (ref 6.4–8.2)
PROT UR STRIP-MCNC: NEGATIVE MG/DL
RBC # BLD AUTO: 4.95 MILLION/UL (ref 3.88–5.62)
SODIUM SERPL-SCNC: 141 MMOL/L (ref 136–145)
SP GR UR STRIP.AUTO: 1.01 (ref 1–1.03)
THC UR QL: POSITIVE
UROBILINOGEN UR QL STRIP.AUTO: 0.2 E.U./DL
WBC # BLD AUTO: 7.1 THOUSAND/UL (ref 4.31–10.16)

## 2020-06-30 PROCEDURE — 80320 DRUG SCREEN QUANTALCOHOLS: CPT | Performed by: EMERGENCY MEDICINE

## 2020-06-30 PROCEDURE — 82948 REAGENT STRIP/BLOOD GLUCOSE: CPT

## 2020-06-30 PROCEDURE — 99284 EMERGENCY DEPT VISIT MOD MDM: CPT

## 2020-06-30 PROCEDURE — 36415 COLL VENOUS BLD VENIPUNCTURE: CPT | Performed by: EMERGENCY MEDICINE

## 2020-06-30 PROCEDURE — 99282 EMERGENCY DEPT VISIT SF MDM: CPT | Performed by: EMERGENCY MEDICINE

## 2020-06-30 PROCEDURE — 80178 ASSAY OF LITHIUM: CPT | Performed by: EMERGENCY MEDICINE

## 2020-06-30 PROCEDURE — 85025 COMPLETE CBC W/AUTO DIFF WBC: CPT | Performed by: EMERGENCY MEDICINE

## 2020-06-30 PROCEDURE — 81003 URINALYSIS AUTO W/O SCOPE: CPT | Performed by: EMERGENCY MEDICINE

## 2020-06-30 PROCEDURE — 80053 COMPREHEN METABOLIC PANEL: CPT | Performed by: EMERGENCY MEDICINE

## 2020-06-30 PROCEDURE — 80307 DRUG TEST PRSMV CHEM ANLYZR: CPT | Performed by: EMERGENCY MEDICINE

## 2020-06-30 RX ORDER — POTASSIUM CHLORIDE 20 MEQ/1
40 TABLET, EXTENDED RELEASE ORAL ONCE
Status: COMPLETED | OUTPATIENT
Start: 2020-06-30 | End: 2020-06-30

## 2020-06-30 RX ADMIN — POTASSIUM CHLORIDE 40 MEQ: 1500 TABLET, EXTENDED RELEASE ORAL at 17:16

## 2020-06-30 NOTE — DISCHARGE INSTRUCTIONS
Polysubstance Abuse   AMBULATORY CARE:   Polysubstance abuse  is the abuse of 2 or more drugs that cause impairment or distress  Substances include alcohol, nicotine, marijuana, cocaine, heroin, methamphetamine, hallucinogens such as mushrooms, or inhalants such as paint thinner  Prescribed medicines, such as opioids for pain or benzodiazepines for anxiety, are also commonly abused  Call 911 for any of the following:   · You feel you might harm yourself or others  Seek care immediately if:   · You have a seizure  · You have chest pain and your heart is beating faster than usual      · You have new shortness of breath  · You are dizzy and lightheaded  Contact your healthcare provider or therapist if:   · You are using drugs and think you are pregnant  · You have withdrawal symptoms and want to start using drugs again  · You have questions or concerns about your condition or care  Medicines:   · Withdrawal medicines  may be given according to the types of drugs you are abusing  Withdrawal from drugs can cause serious, life-threatening side effects  Certain medicines can help decrease your withdrawal symptoms and your desire for the drug  Ask for more information about the withdrawal medicines you may need  · Mood stabilizers  may be given to help prevent or treat depression or anxiety caused by drug abuse and withdrawal      · Take your medicine as directed  Contact your healthcare provider if you think your medicine is not helping or if you have side effects  Tell him or her if you are allergic to any medicine  Keep a list of the medicines, vitamins, and herbs you take  Include the amounts, and when and why you take them  Bring the list or the pill bottles to follow-up visits  Carry your medicine list with you in case of an emergency  Therapy and support  to help you stop using drugs:  · Cognitive and behavioral therapy  helps you change your thinking and behavior   It can help you develop plans to avoid the situations that make you want to use drugs  It also helps you cope with the feelings of wanting to use drugs  You may have individual or group therapy  · Contingency management  helps you set drug-free goals with a therapist  Thong Barclay will decide ways to celebrate your success when you reach a goal      · Family therapy and support groups  allow you and your family members to talk to and be encouraged by other people affected by drug abuse  You and your family members may attend together or separately  Ask your healthcare provider for information about programs in your area  How polysubstance abuse affects unborn or  babies:   · If you are pregnant or get pregnant while using drugs, you may have a miscarriage or give birth early  Your baby may be born addicted to the drugs  · Do not breastfeed your baby if you use drugs  Drugs pass from your bloodstream into your breast milk and affect your baby's health  Talk with your healthcare provider if you are using drugs and breastfeeding  Follow up with your healthcare provider as directed: You may be referred to a specialist to treat health conditions caused by your drug use  This includes mental health, heart, or lung specialists  Write down your questions so you remember to ask them during your visits  ©  [employer]  Information is for End User's use only and may not be sold, redistributed or otherwise used for commercial purposes  All illustrations and images included in CareNotes® are the copyrighted property of [employer]  or Endy Goodwin  The above information is an  only  It is not intended as medical advice for individual conditions or treatments  Talk to your doctor, nurse or pharmacist before following any medical regimen to see if it is safe and effective for you

## 2020-06-30 NOTE — ED NOTES
Pt was observed to be holding hands in mid and air and mouthing words to the wall   Upon entering the room, and giving introductions the pt addressed and spoke to the nurse appropriately      Patti John RN  06/30/20 2160

## 2020-06-30 NOTE — ED PROVIDER NOTES
History  Chief Complaint   Patient presents with    Psychiatric Evaluation     Patient was pulled over by police then sent to ER for Psychiatric eval as per EMS patient was not making sence on scene  Patient reports he has a hx on manic depression and BPD and thinks his medications are wrong  Patient is alert and oriented upon arrival  Denies SI or HI  PAtient states "The police think I was drunk or something "      Patient brought in by EMS for mental health evaluation  Patient was pulled over by police who called EMS because patient was not making sense/acting right  Patient has flat affect and slow to answer questions  AAOx3  Denies any complaints  No SI  Reports compliance with psych medicaitons  Admits to having 3 wine coolers earlier today and some THC,  Denies other drug use  History provided by:  Patient   used: No    Psychiatric Evaluation   Presenting symptoms: no hallucinations and no suicidal thoughts    Associated symptoms: no abdominal pain, no chest pain and no headaches        Prior to Admission Medications   Prescriptions Last Dose Informant Patient Reported? Taking? OLANZapine (ZyPREXA) 20 MG tablet   No No   Sig: Take 1 tablet (20 mg total) by mouth daily at bedtime      Facility-Administered Medications: None       Past Medical History:   Diagnosis Date    ADHD (attention deficit hyperactivity disorder)     Anxiety     Bipolar affective disorder, current episode manic with psychotic symptoms ([facility] ) 12/23/2019    Psychiatric illness     Psychosis ([place] [address] ) 11/23/2019    Substance abuse ([place] [address] )        Past Surgical History:   Procedure Laterality Date    APPENDECTOMY      LASIK         Family History   Problem Relation Age of Onset    Hypertension Mother      I have reviewed and agree with the history as documented      E-Cigarette/Vaping    E-Cigarette Use Never User      E-Cigarette/Vaping Substances    Nicotine No     THC No     CBD No     Flavoring No     Other No     Unknown No      Social History     Tobacco Use    Smoking status: Current Every Day Smoker     Years: 0 00    Smokeless tobacco: Never Used   Substance Use Topics    Alcohol use: Yes     Comment: LAINE    Drug use: Yes     Types: Marijuana       Review of Systems   Constitutional: Negative for fever  Respiratory: Negative for shortness of breath  Cardiovascular: Negative for chest pain and leg swelling  Gastrointestinal: Negative for abdominal pain  Genitourinary: Negative for dysuria  Neurological: Negative for headaches  Psychiatric/Behavioral: Negative for hallucinations and suicidal ideas  All other systems reviewed and are negative  Physical Exam  Physical Exam   Constitutional: He is oriented to person, place, and time  No distress  HENT:   Mouth/Throat: Oropharynx is clear and moist    Eyes: Pupils are equal, round, and reactive to light  Neck: Normal range of motion  Cardiovascular: Normal rate, regular rhythm and intact distal pulses  Pulmonary/Chest: Effort normal and breath sounds normal  No respiratory distress  He has no wheezes  He has no rales  Abdominal: Soft  Bowel sounds are normal  He exhibits no distension  There is no tenderness  There is no rebound and no guarding  Musculoskeletal: Normal range of motion  Neurological: He is alert and oriented to person, place, and time  No cranial nerve deficit or sensory deficit  He exhibits normal muscle tone  Coordination normal    Skin: Capillary refill takes less than 2 seconds  He is not diaphoretic  Nursing note and vitals reviewed        Vital Signs  ED Triage Vitals [06/30/20 1417]   Temperature Pulse Respirations Blood Pressure SpO2   97 8 °F (36 6 °C) (!) 106 18 (!) 167/110 99 %      Temp Source Heart Rate Source Patient Position - Orthostatic VS BP Location FiO2 (%)   Tympanic Monitor Lying Left arm --      Pain Score       --           Vitals:    06/30/20 1417   BP: (!) 167/110   Pulse: (!) 106 Patient Position - Orthostatic VS: Lying         Visual Acuity      ED Medications  Medications   potassium chloride (K-DUR,KLOR-CON) CR tablet 40 mEq (has no administration in time range)       Diagnostic Studies  Results Reviewed     Procedure Component Value Units Date/Time    Ethanol [[id number]]  (Normal) Collected:  06/30/20 1504    Lab Status:  Final result Specimen:  Blood from Arm, Left Updated:  06/30/20 1532     Ethanol Lvl <3 mg/dL     Comprehensive metabolic panel [[id number]]  (Abnormal) Collected:  06/30/20 1504    Lab Status:  Final result Specimen:  Blood from Arm, Left Updated:  06/30/20 1527     Sodium 141 mmol/L      Potassium 3 0 mmol/L      Chloride 101 mmol/L      CO2 30 mmol/L      ANION GAP 10 mmol/L      BUN 6 mg/dL      Creatinine 1 08 mg/dL      Glucose 109 mg/dL      Calcium 9 2 mg/dL      AST 26 U/L      ALT 72 U/L      Alkaline Phosphatase 70 U/L      Total Protein 7 8 g/dL      Albumin 4 3 g/dL      Total Bilirubin 0 50 mg/dL      eGFR 84 ml/min/1 73sq m     Narrative:       [place] guidelines for Chronic Kidney Disease (CKD):     Stage 1 with normal or high GFR (GFR > 90 mL/min/1 73 square meters)    Stage 2 Mild CKD (GFR = 60-89 mL/min/1 73 square meters)    Stage 3A Moderate CKD (GFR = 45-59 mL/min/1 73 square meters)    Stage 3B Moderate CKD (GFR = 30-44 mL/min/1 73 square meters)    Stage 4 Severe CKD (GFR = 15-29 mL/min/1 73 square meters)    Stage 5 End Stage CKD (GFR <15 mL/min/1 73 square meters)  Note: GFR calculation is accurate only with a steady state creatinine    Lithium level [[id number]]  (Normal) Collected:  06/30/20 1504    Lab Status:  Final result Specimen:  Blood from Arm, Left Updated:  06/30/20 1523     Lithium Lvl 0 8 mmol/L     CBC and differential [[id number]] Collected:  06/30/20 1504    Lab Status:  Final result Specimen:  Blood from Arm, Left Updated:  06/30/20 1512     WBC 7 10 Thousand/uL      RBC 4 95 Million/uL Hemoglobin 14 7 g/dL      Hematocrit 40 6 %      MCV 82 fL      MCH 29 7 pg      MCHC 36 2 g/dL      RDW 12 6 %      MPV 9 4 fL      Platelets 048 Thousands/uL      nRBC 0 /100 WBCs      Neutrophils Relative 71 %      Immat GRANS % 0 %      Lymphocytes Relative 19 %      Monocytes Relative 7 %      Eosinophils Relative 2 %      Basophils Relative 1 %      Neutrophils Absolute 5 02 Thousands/µL      Immature Grans Absolute 0 03 Thousand/uL      Lymphocytes Absolute 1 36 Thousands/µL      Monocytes Absolute 0 50 Thousand/µL      Eosinophils Absolute 0 14 Thousand/µL      Basophils Absolute 0 05 Thousands/µL     Rapid drug screen, urine [[id number]]  (Abnormal) Collected:  06/30/20 1437    Lab Status:  Final result Specimen:  Urine, Clean Catch Updated:  06/30/20 1500     Amph/Meth UR Positive     Barbiturate Ur Negative     Benzodiazepine Urine Negative     Cocaine Urine Negative     Methadone Urine Negative     Opiate Urine Negative     PCP Ur Negative     THC Urine Positive     Oxycodone Urine Negative    Narrative:       Presumptive report  If requested, specimen will be sent to reference lab for confirmation  FOR MEDICAL PURPOSES ONLY  IF CONFIRMATION NEEDED PLEASE CONTACT THE LAB WITHIN 5 DAYS      Drug Screen Cutoff Levels:  AMPHETAMINE/METHAMPHETAMINES  1000 ng/mL  BARBITURATES     200 ng/mL  BENZODIAZEPINES     200 ng/mL  COCAINE      300 ng/mL  METHADONE      300 ng/mL  OPIATES      300 ng/mL  PHENCYCLIDINE     25 ng/mL  THC       50 ng/mL  OXYCODONE      100 ng/mL    UA (URINE) with reflex to Scope [[id number]] Collected:  06/30/20 1437    Lab Status:  Final result Specimen:  Urine, Clean Catch Updated:  06/30/20 1446     Color, UA Light Yellow     Clarity, UA Clear     Specific Gravity, UA 1 010     pH, UA 7 0     Leukocytes, UA Negative     Nitrite, UA Negative     Protein, UA Negative mg/dl      Glucose, UA Negative mg/dl      Ketones, UA Negative mg/dl      Urobilinogen, UA 0 2 E U /dl      Bilirubin, UA Negative     Blood, UA Negative    Fingerstick Glucose (POCT) [[id number]]  (Normal) Collected:  06/30/20 1430    Lab Status:  Final result Updated:  06/30/20 1431     POC Glucose 106 mg/dl                  No orders to display              Procedures  Procedures         ED Course       US AUDIT      Most Recent Value   Initial Alcohol Screen: US AUDIT-C    1  How often do you have a drink containing alcohol? 3 Filed at: 06/30/2020 1418   2  How many drinks containing alcohol do you have on a typical day you are drinking? 2 Filed at: 06/30/2020 1418   3a  Male UNDER 65: How often do you have five or more drinks on one occasion? 1 Filed at: 06/30/2020 1418   3b  FEMALE Any Age, or MALE 65+: How often do you have 4 or more drinks on one occassion? 1 Filed at: 06/30/2020 1418   Audit-C Score  7 Filed at: 06/30/2020 1418   Full Alcohol Screen: US AUDIT   4  How often during the last year have you found that you were not able to stop drinking once you had started? 0 Filed at: 06/30/2020 1418   5  How often during past year have you failed to do what was normally expected of you because of drinking? 0 Filed at: 06/30/2020 1418   6  How often in past year have you needed a first drink in the morning to get yourself going after a heavy drinking session? 0 Filed at: 06/30/2020 1418   7  How often in past year have you had feeling of guilt or remorse after drinking? 0 Filed at: 06/30/2020 1418   8  How often in past year have you been unable to remember what happened night before because you had been drinking? 0 Filed at: 06/30/2020 1418   9  Have you or someone else been injured as a result of your drinking? 0 Filed at: 06/30/2020 1418   10   Has a relative, friend, doctor or other health worker been concerned about your drinking and suggested you cut down?   0 Filed at: 06/30/2020 1418   AUDIT Total Score  7 Filed at: 06/30/2020 1418                  MAYRA/DAST-10      Most Recent Value   How many times in the past year have you    Used an illegal drug or used a prescription medication for non-medical reasons? Never Filed at: 06/30/2020 1418                                MDM  Number of Diagnoses or Management Options  Encounter for psychological evaluation:   Polysubstance abuse [facility]):   Diagnosis management comments: Pulse ox 99% on RA indicating adequate oxygenation    Patient seen by Gina Kimble cleared for discharge  Sister coming to pick him up  Amount and/or Complexity of Data Reviewed  Clinical lab tests: ordered and reviewed  Decide to obtain previous medical records or to obtain history from someone other than the patient: yes  Review and summarize past medical records: yes  Discuss the patient with other providers: yes    Patient Progress  Patient progress: stable        Disposition  Final diagnoses:   Encounter for psychological evaluation   Polysubstance abuse ([facility] [address] )     Time reflects when diagnosis was documented in both MDM as applicable and the Disposition within this note     Time User Action Codes Description Comment    6/30/2020  3:36 PM Mora Francisco [Z00 8] Encounter for psychological evaluation     6/30/2020  3:37 PM Vahe Black [F19 10] Polysubstance abuse [facility])       ED Disposition     ED Disposition Condition Date/Time Comment    Discharge Stable Tue Jun 30, 2020 [address] discharge to home/self care  Follow-up Information     Follow up With Specialties Details Why Contact Info    [employer]  In 1 week [phone]            Patient's Medications   Discharge Prescriptions    No medications on file     No discharge procedures on file      PDMP Review       Value Time User    PDMP Reviewed  Yes 12/23/2019 12:05 PM Caleb cardona DO          ED Provider  Electronically Signed by           Magnolia Bishop DO  07/01/20 5098

## 2020-06-30 NOTE — ED NOTES
39 yo DM presents to ER via ambulance which the police called for after interacting with patient - "clutch went out on car which was parked on the [address] - the police told the patient they wanted him to have a mental health check-up"  Patient is not known to PES but is known to [facility]  Stressors: nothing except the clutch going out on patient's car  Mood = "happy*" and reportedly stable  Symptoms include: "sleep has been a little off - don't know when I sleep"; eating "weird stuff"; * -"extra happy because I have found God"; thinking is "sometimes to fast or to slow"; some anxiety with increase "heart rate"; "when I get angry - I do stupid stuff- drive around to look for God"; etoh use - patient reports he drank 3 wine coolers a few hours ago and use "only once in a while"; cannabis use - smoked yesterday - again stated "he only uses once in a while" (UDS was +for cannabis and amphetamines)  The patient denies: all lethality; psychosis; paranoia; hopelessness; anhedonia; any change with appetite/energy/concentration  The patient is requesting d/c  Collateral with patient's sister, Trice Massey [phone]: Patient had a 15 day admission to [facility] - after d/c, patient was good but then took the car and said see you in 2 days - patient is supposed to live me but I told him no drugs in the home (I has adolescents) and the patient got mad; patient said he was staying with a friend; patient had been scribbling - using hundreds of pens in a day"  Sister offered to come and pick patient up - PES explained we would call her back after the patient's labs were completed  Patient was ok with his sister coming to the ER  Sister notified patient is ready for d/c about 15:40 - should be here by 18:00 - patient and ER staff were updated  Sister arrived @ 18:10 to pick-up patient - PES spoke with her privately - will follow up with outpt @ [facility]

## 2020-07-04 ENCOUNTER — HOSPITAL ENCOUNTER (EMERGENCY)
Facility: HOSPITAL | Age: 45
End: 2020-07-10
Attending: EMERGENCY MEDICINE | Admitting: EMERGENCY MEDICINE
Payer: COMMERCIAL

## 2020-07-04 ENCOUNTER — APPOINTMENT (EMERGENCY)
Dept: RADIOLOGY | Facility: HOSPITAL | Age: 45
End: 2020-07-04
Payer: COMMERCIAL

## 2020-07-04 DIAGNOSIS — F20.9 SCHIZOPHRENIA (HCC): ICD-10-CM

## 2020-07-04 DIAGNOSIS — F31.9: ICD-10-CM

## 2020-07-04 DIAGNOSIS — F12.10 MARIJUANA ABUSE: ICD-10-CM

## 2020-07-04 DIAGNOSIS — F31.9 BIPOLAR DISORDER (HCC): Primary | ICD-10-CM

## 2020-07-04 LAB
ALBUMIN SERPL BCP-MCNC: 4 G/DL (ref 3.5–5)
ALP SERPL-CCNC: 69 U/L (ref 46–116)
ALT SERPL W P-5'-P-CCNC: 65 U/L (ref 12–78)
AMPHETAMINES SERPL QL SCN: NEGATIVE
ANION GAP SERPL CALCULATED.3IONS-SCNC: 9 MMOL/L (ref 4–13)
AST SERPL W P-5'-P-CCNC: 30 U/L (ref 5–45)
BARBITURATES UR QL: NEGATIVE
BASOPHILS # BLD AUTO: 0.06 THOUSANDS/ΜL (ref 0–0.1)
BASOPHILS NFR BLD AUTO: 1 % (ref 0–1)
BENZODIAZ UR QL: NEGATIVE
BILIRUB SERPL-MCNC: 0.5 MG/DL (ref 0.2–1)
BILIRUB UR QL STRIP: NEGATIVE
BUN SERPL-MCNC: 12 MG/DL (ref 5–25)
CALCIUM SERPL-MCNC: 9.5 MG/DL (ref 8.3–10.1)
CHLORIDE SERPL-SCNC: 106 MMOL/L (ref 100–108)
CLARITY UR: NORMAL
CO2 SERPL-SCNC: 29 MMOL/L (ref 21–32)
COCAINE UR QL: NEGATIVE
COLOR UR: YELLOW
CREAT SERPL-MCNC: 1.05 MG/DL (ref 0.6–1.3)
EOSINOPHIL # BLD AUTO: 0.6 THOUSAND/ΜL (ref 0–0.61)
EOSINOPHIL NFR BLD AUTO: 11 % (ref 0–6)
ERYTHROCYTE [DISTWIDTH] IN BLOOD BY AUTOMATED COUNT: 12.9 % (ref 11.6–15.1)
ETHANOL SERPL-MCNC: <3 MG/DL (ref 0–3)
GFR SERPL CREATININE-BSD FRML MDRD: 87 ML/MIN/1.73SQ M
GLUCOSE SERPL-MCNC: 98 MG/DL (ref 65–140)
GLUCOSE UR STRIP-MCNC: NEGATIVE MG/DL
HCT VFR BLD AUTO: 39.1 % (ref 36.5–49.3)
HGB BLD-MCNC: 13.9 G/DL (ref 12–17)
HGB UR QL STRIP.AUTO: NEGATIVE
IMM GRANULOCYTES # BLD AUTO: 0.01 THOUSAND/UL (ref 0–0.2)
IMM GRANULOCYTES NFR BLD AUTO: 0 % (ref 0–2)
KETONES UR STRIP-MCNC: NEGATIVE MG/DL
LEUKOCYTE ESTERASE UR QL STRIP: NEGATIVE
LITHIUM SERPL-SCNC: 0.5 MMOL/L (ref 0.5–1)
LYMPHOCYTES # BLD AUTO: 1.81 THOUSANDS/ΜL (ref 0.6–4.47)
LYMPHOCYTES NFR BLD AUTO: 32 % (ref 14–44)
MAGNESIUM SERPL-MCNC: 2.1 MG/DL (ref 1.6–2.6)
MCH RBC QN AUTO: 29.5 PG (ref 26.8–34.3)
MCHC RBC AUTO-ENTMCNC: 35.5 G/DL (ref 31.4–37.4)
MCV RBC AUTO: 83 FL (ref 82–98)
METHADONE UR QL: NEGATIVE
MONOCYTES # BLD AUTO: 0.44 THOUSAND/ΜL (ref 0.17–1.22)
MONOCYTES NFR BLD AUTO: 8 % (ref 4–12)
NEUTROPHILS # BLD AUTO: 2.82 THOUSANDS/ΜL (ref 1.85–7.62)
NEUTS SEG NFR BLD AUTO: 48 % (ref 43–75)
NITRITE UR QL STRIP: NEGATIVE
NRBC BLD AUTO-RTO: 0 /100 WBCS
OPIATES UR QL SCN: NEGATIVE
OXYCODONE+OXYMORPHONE UR QL SCN: NEGATIVE
PCP UR QL: NEGATIVE
PH UR STRIP.AUTO: 7 [PH]
PLATELET # BLD AUTO: 335 THOUSANDS/UL (ref 149–390)
PMV BLD AUTO: 9.5 FL (ref 8.9–12.7)
POTASSIUM SERPL-SCNC: 3.5 MMOL/L (ref 3.5–5.3)
PROT SERPL-MCNC: 7.2 G/DL (ref 6.4–8.2)
PROT UR STRIP-MCNC: NEGATIVE MG/DL
RBC # BLD AUTO: 4.71 MILLION/UL (ref 3.88–5.62)
SODIUM SERPL-SCNC: 144 MMOL/L (ref 136–145)
SP GR UR STRIP.AUTO: 1.01 (ref 1–1.03)
THC UR QL: POSITIVE
UROBILINOGEN UR QL STRIP.AUTO: 0.2 E.U./DL
WBC # BLD AUTO: 5.74 THOUSAND/UL (ref 4.31–10.16)

## 2020-07-04 PROCEDURE — 80178 ASSAY OF LITHIUM: CPT | Performed by: EMERGENCY MEDICINE

## 2020-07-04 PROCEDURE — 71045 X-RAY EXAM CHEST 1 VIEW: CPT

## 2020-07-04 PROCEDURE — 81003 URINALYSIS AUTO W/O SCOPE: CPT | Performed by: EMERGENCY MEDICINE

## 2020-07-04 PROCEDURE — 80307 DRUG TEST PRSMV CHEM ANLYZR: CPT | Performed by: EMERGENCY MEDICINE

## 2020-07-04 PROCEDURE — 99285 EMERGENCY DEPT VISIT HI MDM: CPT | Performed by: EMERGENCY MEDICINE

## 2020-07-04 PROCEDURE — 85025 COMPLETE CBC W/AUTO DIFF WBC: CPT | Performed by: EMERGENCY MEDICINE

## 2020-07-04 PROCEDURE — 36415 COLL VENOUS BLD VENIPUNCTURE: CPT | Performed by: EMERGENCY MEDICINE

## 2020-07-04 PROCEDURE — 80320 DRUG SCREEN QUANTALCOHOLS: CPT | Performed by: EMERGENCY MEDICINE

## 2020-07-04 PROCEDURE — 93005 ELECTROCARDIOGRAM TRACING: CPT

## 2020-07-04 PROCEDURE — 99285 EMERGENCY DEPT VISIT HI MDM: CPT

## 2020-07-04 PROCEDURE — 83735 ASSAY OF MAGNESIUM: CPT | Performed by: EMERGENCY MEDICINE

## 2020-07-04 PROCEDURE — 80053 COMPREHEN METABOLIC PANEL: CPT | Performed by: EMERGENCY MEDICINE

## 2020-07-04 NOTE — ED NOTES
[facility] and [facility] residential - patient is not eligible for services @ either place (due to county of residence)  No [facility] beds  Either no beds or not taking out-of county uninsured patient - Klaudia Officer; Natacha; Friends; Ana; [place]  [facility] [phone] - they almost no bed availability but willing to look at the referral - fax # [phone]  PES had patient sign a 201 for the referral   The ER Attending went into see the patient (19:30) who said he was fine and wanted to be d/c'd  PES then spoke with the patient who again said he was fine and wanted to be d/c'd  Donnell Kay / Luna Rebolledo was notified of the request for screening about 19:35 - they change shifts @ 20:00 and will be in touch after that  ER staff updated  The Donnell Kay worker does not have access to Epic - chart was faxed to their offices @ 20:35 and worker should arrive there by 21:30  PES left @ 23:00 with the tele-crisis not completed as yet  Tele-crisis was completed about 23:00-23:35 - patient to be tele-psyched

## 2020-07-04 NOTE — ED NOTES
Encouraged to identify and avoid triggers. Please see full PES assessment and note dated 6/30/20  Patient is a 39 yo male who presents via ambulance after the Ofelia Staton came into contact with [address] on [address] when his clutch stopped working on his car  This is extremely similar to the presentation of 6/30/20  The patient is known to PES and [employer]  Stressors: The patient is homeless by choice - was staying with sister and her family in [place] but was told he could use drugs why there (due to adolescent children being there) - so the patient left  Mood = "good"  Symptoms include: "I have found God"; patient has a compulsion with scribbling non-sense on paper which he is doing again; "I have a problem with my memory due to bipolar, schizophrenia and manic"; patient reports daily use of cannabis; sleep and appetite are normal per patient report  The patient denies: psychosis; etoh use; paranoia and any/all lethality  The patient reports compliance with his Rx's  The patient is requesting an inpt admission - assume it is because the patient just doesn't feel right  The patient refused to provide a collateral contact when asked
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
## 2020-07-05 LAB — SARS-COV-2 RNA RESP QL NAA+PROBE: NEGATIVE

## 2020-07-05 PROCEDURE — 93005 ELECTROCARDIOGRAM TRACING: CPT

## 2020-07-05 PROCEDURE — 87635 SARS-COV-2 COVID-19 AMP PRB: CPT | Performed by: EMERGENCY MEDICINE

## 2020-07-05 RX ORDER — LORAZEPAM 1 MG/1
1 TABLET ORAL ONCE
Status: COMPLETED | OUTPATIENT
Start: 2020-07-05 | End: 2020-07-05

## 2020-07-05 RX ORDER — RISPERIDONE 1 MG/1
1 TABLET, FILM COATED ORAL 2 TIMES DAILY
Status: DISCONTINUED | OUTPATIENT
Start: 2020-07-05 | End: 2020-07-07

## 2020-07-05 RX ADMIN — LORAZEPAM 1 MG: 1 TABLET ORAL at 06:43

## 2020-07-05 RX ADMIN — RISPERIDONE 1 MG: 1 TABLET ORAL at 18:06

## 2020-07-05 NOTE — ED NOTES
Vitals taken, pt awake and alert  Pt cooperative at this time, no distress noted       Sanjuanita Shea, RN  07/05/20 6921

## 2020-07-05 NOTE — ED NOTES
Pt sitting quietly  No acute distress noted   Remains on continuous observation     Karina Cohen RN  07/04/20 2051

## 2020-07-05 NOTE — ED NOTES
Family guidance calling on the computer and attempted to get the psychiatrist on the phone      Shelia Baldwin RN  07/05/20 Hunter Hernández RN  07/05/20 6530

## 2020-07-05 NOTE — ED NOTES
Called into patient's again  Pt again stating he wants to leave and wants to know why the police brought him here when he didn't commit a crime  States he was out of gas and that's not a crime  I again explained to patient he was brought in by the police for reasons they felt he needed to be evaluated  Pt then states to me "so then evaluate me"  I explained to patient that he was evaluated by our crisis person but now that he wants to leave he needs to be evaluated by a psychiatrist  Pt states "well I'm not going to another hospital"  Explained to patient that is up to the psychiatrist   Pt again verbalized understanding    Remains on continuous watch     Olivier Neal, MERLY  07/04/20 2032

## 2020-07-05 NOTE — ED NOTES
Pt awake and pacing around his room and to and from the bathroom  Pt denies any concerns or complaints and is redirectable    Currently back in bed and on continuous observation     Marylen Fredericks, RN  07/05/20 7237

## 2020-07-05 NOTE — ED NOTES
Pt quietly sitting in stretcher, no distress noted  1:1 sitting for continual observation       [address], RN  07/04/20 7209

## 2020-07-05 NOTE — ED NOTES
Family guidance returned call and pt is committed     Tanner Morataya, [facility]  07/05/20 5486

## 2020-07-05 NOTE — ED NOTES
Pt sitting quietly in the stretcher  No distress noted at this time   1:1 sitting for pt safety     Allen Alvarado RN  07/04/20 1595

## 2020-07-05 NOTE — ED CARE HANDOFF
Emergency Department Sign Out Note        Sign out and transfer of care from Dr Kasi Pathak at [facility]  See Separate Emergency Department note  The patient, Ailyn Baker, was evaluated by the previous provider for psychiatric evaluation  Workup Completed:  Medically cleared  ED Course / Workup Pending (followup): Awaiting telepsych  Patient committed by telepsychiatry  Procedures  MDM    Disposition  Final diagnoses:   Bipolar disorder ([facility] )   Manic depression ([facility] )   Schizophrenia ([facility] )     Time reflects when diagnosis was documented in both MDM as applicable and the Disposition within this note     Time User Action Codes Description Comment    7/4/2020  7:47 PM Ct Ode Add [F31 9] Bipolar disorder ([facility] )     7/4/2020  7:47 PM Ct Ode Add [F31 9] Manic depression ([facility] )     7/4/2020  7:48 PM Ct Ode Add [F20 9] Schizophrenia [facility])       ED Disposition     None      Follow-up Information    None       Patient's Medications   Discharge Prescriptions    No medications on file     No discharge procedures on file         ED Provider  Electronically Signed by     Valentino Solomons, MD  07/05/20 5340

## 2020-07-05 NOTE — ED NOTES
Called into patient's room  Pt angry and stating he wants to leave  States "why am I here I didn't commit a crime"  Explained to patient that he no longer wanted to go anywhere voluntary and the doctor didn't feel he was safe for discharge so he needs to be evaluated by a psychiatrist  Pt currently in queue for family guidance   Pt verbalized understanding            Deondre Christensen RN  07/05/20 0824
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
## 2020-07-05 NOTE — ED NOTES
Pt pleasant and is alert and oriented x 3  Asked the patient is he is seeing things and he said yes, asked the patient if he is hearing voices and he said yes    However, when I asked what the voices were saying he seemed confused and just stared at me and said "yes, nita may, yes"  He then got quiet and lay back in bed     Raimundo Zuñiga RN  07/05/20 0126

## 2020-07-06 RX ORDER — LORAZEPAM 1 MG/1
1 TABLET ORAL ONCE
Status: COMPLETED | OUTPATIENT
Start: 2020-07-06 | End: 2020-07-06

## 2020-07-06 RX ORDER — ONDANSETRON 4 MG/1
4 TABLET, ORALLY DISINTEGRATING ORAL ONCE
Status: COMPLETED | OUTPATIENT
Start: 2020-07-06 | End: 2020-07-06

## 2020-07-06 RX ADMIN — RISPERIDONE 1 MG: 1 TABLET ORAL at 19:01

## 2020-07-06 RX ADMIN — LORAZEPAM 1 MG: 1 TABLET ORAL at 11:14

## 2020-07-06 RX ADMIN — RISPERIDONE 1 MG: 1 TABLET ORAL at 09:18

## 2020-07-06 RX ADMIN — LORAZEPAM 1 MG: 1 TABLET ORAL at 00:23

## 2020-07-06 RX ADMIN — ONDANSETRON 4 MG: 4 TABLET, ORALLY DISINTEGRATING ORAL at 09:53

## 2020-07-06 NOTE — ED NOTES
Patient asked about his status - patient not understanding his commitment - PES attempted to explain this to patient  Called [facility] / [facility] @ 15:30 - no beds available and referred to [facility] for assistance in placement (borrow a bed from another [facility])  Called [address] [facility] for an update about 20:30 - declined @ [facility] and no other referrals at this time  The patient will need his second tele-psych tomorrow

## 2020-07-06 NOTE — ED NOTES
Patient awake and ambulatory to the bathroom with steady gait  Patient remains primarily calm and cooperative  Patient is requesting Mali Henao, Dr Bita Patterson is aware, pending new orders       Cecelia Cox RN  07/06/20 0263

## 2020-07-06 NOTE — ED NOTES
Pt noted to be laying in bed with eyes closed, no distress noted at this time        Miguel Guzman RN  07/06/20 4520

## 2020-07-06 NOTE — ED NOTES
7/6/20 @ [phone]:  PES updated by [facility] at family guidance center; patient referred to [facility]  [address]    1118: PES received call from patient's sister, who is his emergency contact, and provided update on condition and bed search  PES provided sister with direct number to crisis, and she will keep in touch    [address] [place]

## 2020-07-06 NOTE — ED CARE HANDOFF
Emergency Department Sign Out Note        Sign out and transfer of care from previous provider  See Separate Emergency Department note  The patient, Kim Slaughter, was evaluated by the previous provider for psychiatric evaluation  Workup Completed:  Medical clearance workup  Commitment via tele psychiatry    ED Course / Workup Pending (followup): Inpatient bed search                          ED Course as of Jul 06 0016   Sat Jul 04, 2020 1941 Patient was evaluated by our crisis worker  Procedures  MDM  Number of Diagnoses or Management Options  Bipolar disorder ([facility] ):   Manic depression ([facility] ):   Schizophrenia ([facility] ):   Risk of Complications, Morbidity, and/or Mortality  General comments: Patient was medically cleared by previous provider  Patient was evaluated by tele psychiatry and committed for inpatient psych placement  Patient is currently awaiting bed search  No acute issues noted during my shift  Patient requested abdomen to help him sleep  Ativan was given  At this time care patient signed out to the next attending who will continue to monitor patient until an inpatient psychiatric bed has become available  Patient Progress  Patient progress: stable      Disposition  Final diagnoses:   Bipolar disorder ([facility] )   Manic depression ([facility] )   Schizophrenia ([facility] )     Time reflects when diagnosis was documented in both MDM as applicable and the Disposition within this note     Time User Action Codes Description Comment    7/4/2020  7:47 PM Gene Clamp Add [F31 9] Bipolar disorder ([facility] )     7/4/2020  7:47 PM Gene Clamp Add [F31 9] Manic depression ([facility] )     7/4/2020  7:48 PM Gene Clamp Add [F20 9] Schizophrenia [facility])       ED Disposition     None      Follow-up Information    None       Patient's Medications   Discharge Prescriptions    No medications on file     No discharge procedures on file         ED Provider  Electronically Signed by     Maritza Aguirre DO  07/06/20 [address]

## 2020-07-06 NOTE — ED NOTES
Patient continues to sleep,repostioning himself in bed  No distress noted  Remains on continual observation        [facility]  07/06/20 1641

## 2020-07-06 NOTE — ED NOTES
Pt noted to be laying in bed with eyes closed, no distress noted at this time        Chrissy Nobles RN  07/06/20 6714

## 2020-07-06 NOTE — ED NOTES
Pt noted to have repositioned self in the bed, resting quietly        Heidy Navarro RN  07/06/20 9827

## 2020-07-06 NOTE — ED NOTES
Awakens to gentle stimulation  Offers no complaints  Ate minimal amount of dinner       Dario Ho RN  07/06/20 6977

## 2020-07-06 NOTE — ED NOTES
Pt continues to rest quietly in the stretcher, no distress noted  Continuous observation in place        Ray Rodrigues RN  07/06/20 0961

## 2020-07-06 NOTE — ED NOTES
Received pt from previous nurse  Patient is sitting in bed picking at his breakfast  Patient reports some nausea and states "My stomach is a little upset this am I don't really feel like eating breakfast " Dr Trino Ryan notified of patient complaint  Patient denies any additional complaints at this time  Patient denies SI or HI  Patient denies hearing vioices and denies any hallucinations or delusions at this time  1:1 continuous patient monitoring continues with Carly Moose  PCA for patient safety  Will continue to monitor        Johanna Avendano RN  07/06/20 1449

## 2020-07-06 NOTE — ED NOTES
Patient continues to sleep, has blanket over head  Snoring can be herd and lights dimmed for comfort  Continual observation maintained        Skip   07/06/20 5423

## 2020-07-06 NOTE — ED NOTES
Pt resting quietly in the bed, respirations easy and unlabored        Jared Barkley RN  07/06/20 0537

## 2020-07-06 NOTE — ED NOTES
Report received from offgoing RN  Pt sleeping soundly, repositioning self in bed  1:1 continues        Ken Ziegler RN  07/06/20 7894

## 2020-07-07 RX ORDER — LORAZEPAM 2 MG/1
2 TABLET ORAL EVERY 6 HOURS PRN
COMMUNITY

## 2020-07-07 RX ORDER — HALOPERIDOL 5 MG
5 TABLET ORAL EVERY 6 HOURS PRN
Status: DISCONTINUED | OUTPATIENT
Start: 2020-07-07 | End: 2020-07-10 | Stop reason: HOSPADM

## 2020-07-07 RX ORDER — LITHIUM CARBONATE 300 MG
450 TABLET ORAL 3 TIMES DAILY
COMMUNITY

## 2020-07-07 RX ORDER — RISPERIDONE 2 MG/1
2 TABLET, FILM COATED ORAL
COMMUNITY

## 2020-07-07 RX ORDER — DIPHENHYDRAMINE HCL 25 MG
25 TABLET ORAL AS NEEDED
COMMUNITY

## 2020-07-07 RX ORDER — LORAZEPAM 1 MG/1
1 TABLET ORAL ONCE
Status: COMPLETED | OUTPATIENT
Start: 2020-07-07 | End: 2020-07-07

## 2020-07-07 RX ORDER — LORAZEPAM 1 MG/1
2 TABLET ORAL EVERY 6 HOURS PRN
Status: DISCONTINUED | OUTPATIENT
Start: 2020-07-07 | End: 2020-07-10 | Stop reason: HOSPADM

## 2020-07-07 RX ORDER — MONTELUKAST SODIUM 10 MG/1
10 TABLET ORAL
COMMUNITY

## 2020-07-07 RX ORDER — HALOPERIDOL 5 MG
5 TABLET ORAL EVERY 6 HOURS PRN
COMMUNITY

## 2020-07-07 RX ADMIN — HALOPERIDOL 5 MG: 5 TABLET ORAL at 14:40

## 2020-07-07 RX ADMIN — LORAZEPAM 1 MG: 1 TABLET ORAL at 03:35

## 2020-07-07 RX ADMIN — LORAZEPAM 2 MG: 1 TABLET ORAL at 14:40

## 2020-07-07 RX ADMIN — RISPERIDONE 1 MG: 1 TABLET ORAL at 10:58

## 2020-07-07 NOTE — ED NOTES
Family here to visit  Pt tearful and crying when talking with friend  They left- they were given update on bed search and will check tomorrow       Vasu Crimes, MERLY  07/07/20 4737

## 2020-07-07 NOTE — ED NOTES
Per screening psychiatrist, pt to have PRN Haldol 5 mg and Ativan 2 mg q 6 hours     Anshul Roth RN  07/07/20 3398

## 2020-07-07 NOTE — ED NOTES
Pt sleeping at this time  1:1 for safety continues        Ghassan Cantrell RN  07/07/20 9904 I will START or STAY ON the medications listed below when I get home from the hospital:    acetaminophen 325 mg oral tablet  -- 2 tab(s) by mouth every 6 hours, As needed, Mild Pain (1 - 3)  -- Indication: For Mild pain    traMADol 50 mg oral tablet  -- 0.5 tab(s) by mouth every 8 hours, As needed, Moderate and severe Pain  -- Indication: For Moderate to severe pain    Xanax  -- 0.125 milligram(s) by mouth once a day (at bedtime), As Needed  -- Indication: For Anxiety    amLODIPine 5 mg oral tablet  -- 1 tab(s) by mouth once a day  -- Indication: For Elevated blood pressure reading    docusate sodium 100 mg oral capsule  -- 1 cap(s) by mouth 2 times a day  -- Indication: For constipation    calcium-vitamin D 500 mg-200 intl units oral tablet  -- 1 tab(s) by mouth 2 times a day  -- Indication: For Vitamin    cyanocobalamin 1000 mcg oral tablet  -- 1 tab(s) by mouth once a day  -- Indication: For Vitamin    ascorbic acid 500 mg oral tablet  -- 1 tab(s) by mouth once a day  -- Indication: For Vitamin

## 2020-07-07 NOTE — ED NOTES
Patient walking in common area, unable to sleep requesting medication to sleep     Felipe Albrecht, RN  07/07/20 0467

## 2020-07-07 NOTE — ED NOTES
Pt pleasant and cooperative  Awaiting dispo  Offered shower but sts he doesn't feel like it       Arianna Borden RN  07/07/20 0319

## 2020-07-07 NOTE — ED NOTES
Television remote offered & provided to patient  Now sitting in bed watching tv        Grady Saba RN  07/07/20 0756

## 2020-07-07 NOTE — ED NOTES
7/7/20 @ 85 99 60:  PES received call from [address] [facility]; will need to do 2nd telepsych and off-site consult  No bed has been located at this time  Domingo Fay, [address] Varinder: Off-site consult completed by [address] from [facility]; telepsych pending  Shyanne Wallis    1115: Telepsych completed; will await MD decision  [address] Fay, [address]: PES updated patient's sister    [address] Fay, [place]

## 2020-07-07 NOTE — ED NOTES
Patient awake and ambulated to the bathroom with steady gait, requesting ativan for sleep       Ramon Juarez RN  07/07/20 0002

## 2020-07-07 NOTE — ED NOTES
Family called and came into visit  Called Raman Felix / Mariana MORELOS @ 17:50 - still a pending bed search with no bites  Called [facility] for an update @ 21:50 - someone will have to call PES back with the update  Spoke with [address] Felix / Radha Sheffield about 22:50 - no update - she called [facility] for a "diversion" bed but they have not returned her call

## 2020-07-08 LAB
ATRIAL RATE: 103 BPM
ATRIAL RATE: 89 BPM
ATRIAL RATE: 95 BPM
P AXIS: 128 DEGREES
P AXIS: 44 DEGREES
PR INTERVAL: 160 MS
PR INTERVAL: 162 MS
PR INTERVAL: 168 MS
QRS AXIS: 150 DEGREES
QRS AXIS: 173 DEGREES
QRS AXIS: 3 DEGREES
QRSD INTERVAL: 70 MS
QRSD INTERVAL: 88 MS
QRSD INTERVAL: 90 MS
QT INTERVAL: 348 MS
QT INTERVAL: 376 MS
QT INTERVAL: 388 MS
QTC INTERVAL: 455 MS
QTC INTERVAL: 457 MS
QTC INTERVAL: 487 MS
T WAVE AXIS: 138 DEGREES
T WAVE AXIS: 14 DEGREES
T WAVE AXIS: 162 DEGREES
VENTRICULAR RATE: 103 BPM
VENTRICULAR RATE: 89 BPM
VENTRICULAR RATE: 95 BPM

## 2020-07-08 PROCEDURE — 93010 ELECTROCARDIOGRAM REPORT: CPT | Performed by: INTERNAL MEDICINE

## 2020-07-08 RX ADMIN — LORAZEPAM 2 MG: 1 TABLET ORAL at 04:18

## 2020-07-08 RX ADMIN — HALOPERIDOL 5 MG: 5 TABLET ORAL at 11:58

## 2020-07-08 RX ADMIN — LORAZEPAM 2 MG: 1 TABLET ORAL at 11:58

## 2020-07-08 RX ADMIN — HALOPERIDOL 5 MG: 5 TABLET ORAL at 04:18

## 2020-07-08 NOTE — ED NOTES
Pt resting quietly in the bed, respirations easy and unlabored        Priyanka Santiago RN  07/08/20 9821

## 2020-07-08 NOTE — ED NOTES
7/8/20 @ 0945:  PES met with patient and will update as needed  Patient was calm and cooperative  Cj Butts, [place]    1100: PES spoke to Leslie Elias [facility]; not beds located and doesn't qualify for "diversion bed," due to homelessness and PA resident; however, bed search will continue    Cj Butts, [place]

## 2020-07-08 NOTE — ED NOTES
Received report from previous nurse  Patient recommitted and bed search in progress  Patient currently sleeping in bed  Respirations easy and unlabored  Continual observation maintained       Agnes Thao RN  07/08/20 6519

## 2020-07-08 NOTE — ED NOTES
Pt noted to be laying in bed with eyes closed, no distress noted at this time        Cecilia Perez RN  07/08/20 1967

## 2020-07-08 NOTE — ED NOTES
Pt noted to have repositioned self in the bed, resting quietly        Farhan Sidhu, MERLY  07/08/20 2471

## 2020-07-08 NOTE — ED NOTES
Patient currently sleeping in bed  Frequently repositions self  No distress noted  Will continue to monitor       Denver Mendez RN  07/08/20 9694

## 2020-07-08 NOTE — ED NOTES
Pt requested to have anxiety med  When meds brought in pt was sleeping on stretcher  Awoke easily  Phone given at his request   Remains cooperative  Lunch ordered       Mark Kohli RN  07/08/20 5262 [facility] Efren Last RN  07/08/20 6018

## 2020-07-08 NOTE — ED NOTES
Patient in bed resting comfortably  Patient observed repositioning self  No distress noted  Will continue to monitor       Svetlana Rick RN  07/08/20 2922

## 2020-07-08 NOTE — ED NOTES
Patient sleeping  Respirations easy and unlabored  Continual observation maintained       Sherry Coffey RN  07/08/20 2446

## 2020-07-08 NOTE — ED NOTES
Called [facility] / Shelby Lexus for update @ 16:30 - no beds  Patient was updated; stated he was "fine to be d/c'd to his sister's home" - explained the commitment is now for 20 days  MAR faxed to [facility] / Rigoberto Griffiths @ 17:00 and along with a physician signed CAU form

## 2020-07-09 RX ADMIN — HALOPERIDOL 5 MG: 5 TABLET ORAL at 17:34

## 2020-07-09 RX ADMIN — HALOPERIDOL 5 MG: 5 TABLET ORAL at 03:51

## 2020-07-09 RX ADMIN — LORAZEPAM 2 MG: 1 TABLET ORAL at 03:51

## 2020-07-09 RX ADMIN — LORAZEPAM 2 MG: 1 TABLET ORAL at 17:35

## 2020-07-09 NOTE — ED NOTES
Pt sleeping,regular,unlabored respirations noted  Observation continues       Martinez Fay RN  07/08/20 2159       Martinez Fay RN  07/08/20 2938

## 2020-07-09 NOTE — ED NOTES
Observation continues,pt sleeping,regular,unlabored respirations noted       Anshu Kaplan RN  07/09/20 2742

## 2020-07-09 NOTE — ED CARE HANDOFF
Emergency Department Sign Out Note        Sign out and transfer of care from previous provider  See Separate Emergency Department note  The patient, Shelly Perry, was evaluated by the previous provider for psychiatric evaluation  Workup Completed:  Medical clearance workup    ED Course / Workup Pending (followup): Inpatient psychiatric bed search                          ED Course as of Jul 09 1715   Sat Jul 04, 2020 1941 Patient was evaluated by our crisis worker  Procedures  MDM  Number of Diagnoses or Management Options  Bipolar disorder ([facility] ):   Manic depression ([address] ):   Schizophrenia ([address] ):   Risk of Complications, Morbidity, and/or Mortality  General comments: Patient was medically cleared by previous provider  Patient is currently awaiting bed search  No acute issues noted during my shift  Care patient signed out to the next attending will continue to monitor patient until an inpatient psychiatric bed has become available  Patient Progress  Patient progress: stable      Disposition  Final diagnoses:   Bipolar disorder ([facility] )   Manic depression ([address] )   Schizophrenia ([facility] )     Time reflects when diagnosis was documented in both MDM as applicable and the Disposition within this note     Time User Action Codes Description Comment    7/4/2020  7:47 PM Loral Clarence Add [F31 9] Bipolar disorder ([facility] )     7/4/2020  7:47 PM Loral Clarence Add [F31 9] Manic depression ([facility] )     7/4/2020  7:48 PM Loral Clarence Add [F20 9] Schizophrenia [facility])       ED Disposition     None      Follow-up Information    None       Patient's Medications   Discharge Prescriptions    No medications on file     No discharge procedures on file         ED Provider  Electronically Signed by     Mendoza Lemons DO  07/09/20 0900

## 2020-07-09 NOTE — ED NOTES
Pt resting quietly  No distress noted    meds given at pt request       Faustina Shay RN  07/09/20 9615

## 2020-07-09 NOTE — ED NOTES
Pt is quietly resting in bed , pt looks okay in lying position facing wall     Yen Gonzales  07/09/20 5173

## 2020-07-09 NOTE — ED NOTES
Pt sleeping,regular,unlabored respirations noted  Observation continues       Sarbjit Mays, MERLY  07/09/20 4163

## 2020-07-09 NOTE — ED NOTES
Pt continues sleeping,moves self around in bed,regular,unlabored respirations noted  Observation continues       Savannah Brown RN  07/08/20 6590

## 2020-07-09 NOTE — ED NOTES
Pt sleeping,regular,unlabored respirations noted  Observation continues       Savannah Brown RN  07/09/20 0240

## 2020-07-09 NOTE — ED NOTES
Pt awake went to bathroom and is now back in room on stretcher     [employer]  07/09/20 [address] [place] [address]

## 2020-07-09 NOTE — ED NOTES
7/9/20 @ 0900:  Patient asked to brush his teeth and clean up; therefore, patient was provided needed supplies  In addition, patient was updated on bed situation, which made him tearful and desperate  PES will continue to monitor  1800 Slade Alexander: PES updated by [facility] Whit[facility]; patient is referred to Gali Ashby and the Opal Wallis, [place]    1225: PES updated patient; no bed currently located, but is referred to Gali Fay [place]

## 2020-07-09 NOTE — ED NOTES
Pt awake,asking about plan of care,explained he is committed and bed search being done  Pt upset,started crying,asked for meds to sleep  Dr Sarabjit Boyle made aware        Janet Mak RN  07/09/20 2310

## 2020-07-10 VITALS
WEIGHT: 180 LBS | HEART RATE: 91 BPM | OXYGEN SATURATION: 99 % | DIASTOLIC BLOOD PRESSURE: 81 MMHG | TEMPERATURE: 98.6 F | BODY MASS INDEX: 28.19 KG/M2 | SYSTOLIC BLOOD PRESSURE: 132 MMHG | RESPIRATION RATE: 20 BRPM

## 2020-07-10 NOTE — EMTALA/ACUTE CARE TRANSFER
[address] [address]  Dept: [phone]      EMTALA TRANSFER CONSENT    NAME Rayna Figueroa                                         1975                              MRN [id number]    I have been informed of my rights regarding examination, treatment, and transfer   by Dr Sanders att  providers found    Benefits: Specialized equipment and/or services available at the receiving facility (Include comment)________________________    Risks: Potential for delay in receiving treatment, Increased discomfort during transfer      Consent for Transfer:  I acknowledge that my medical condition has been evaluated and explained to me by the emergency department physician or other qualified medical person and/or my attending physician, who has recommended that I be transferred to the service of  Accepting Physician: Dr Hali Guerrero at [facility] Name, [address] : [facility], [address] [place] [address]  The above potential benefits of such transfer, the potential risks associated with such transfer, and the probable risks of not being transferred have been explained to me, and I fully understand them  The doctor has explained that, in my case, the benefits of transfer outweigh the risks  I agree to be transferred  I authorize the performance of emergency medical procedures and treatments upon me in both transit and upon arrival at the receiving facility  Additionally, I authorize the release of any and all medical records to the receiving facility and request they be transported with me, if possible  I understand that the safest mode of transportation during a medical emergency is an ambulance and that the Hospital advocates the use of this mode of transport   Risks of traveling to the receiving facility by car, including absence of medical control, life sustaining equipment, such as oxygen, and medical personnel has been explained to me and I fully understand them  (CHUY CORRECT BOX BELOW)  [  ]  I consent to the stated transfer and to be transported by ambulance/helicopter  [  ]  I consent to the stated transfer, but refuse transportation by ambulance and accept full responsibility for my transportation by car  I understand the risks of non-ambulance transfers and I exonerate the Hospital and its staff from any deterioration in my condition that results from this refusal     X___________________________________________    DATE  07/10/20  TIME________  Signature of patient or legally responsible individual signing on patient behalf           RELATIONSHIP TO PATIENT_________________________          Provider Certification    NAME Shantelle Gómez                                         1975                              MRN [id number]    A medical screening exam was performed on the above named patient  Based on the examination:    Condition Necessitating Transfer The primary encounter diagnosis was Bipolar disorder ([facility] )  Diagnoses of Manic depression ([address] ), Schizophrenia ([facility] ), and Marijuana abuse were also pertinent to this visit      Patient Condition: The patient has been stabilized such that within reasonable medical probability, no material deterioration of the patient condition or the condition of the unborn child(jamir) is likely to result from the transfer    Reason for Transfer: Level of Care needed not available at this facility    Transfer Requirements: [facility], [address] [place] [address]   · Space available and qualified personnel available for treatment as acknowledged by    · Agreed to accept transfer and to provide appropriate medical treatment as acknowledged by       Dr Uriel Cueva  · Appropriate medical records of the examination and treatment of the patient are provided at the time of transfer   [address] _______  · Transfer will be performed by qualified personnel from    and appropriate transfer equipment as required, including the use of necessary and appropriate life support measures  Provider Certification: I have examined the patient and explained the following risks and benefits of being transferred/refusing transfer to the patient/family:  The patient is stable for psychiatric transfer because they are medically stable, and is protected from harming him/herself or others during transport, Consent was not obtained as patient is committed to psychiatric facility and transfer is mandated      Based on these reasonable risks and benefits to the patient and/or the unborn child(jamir), and based upon the information available at the time of the patients examination, I certify that the medical benefits reasonably to be expected from the provision of appropriate medical treatments at another medical facility outweigh the increasing risks, if any, to the individuals medical condition, and in the case of labor to the unborn child, from effecting the transfer      X____________________________________________ DATE 07/10/20        TIME_______      ORIGINAL - SEND TO MEDICAL RECORDS   COPY - SEND WITH PATIENT DURING TRANSFER

## 2020-07-10 NOTE — ED NOTES
Patient transferred to [facility] via [employer] [facility]  Patient left without no difficulties        Ricci Griggs RN  07/10/20 0096

## 2020-07-10 NOTE — ED NOTES
7/10/20 @ 0945:  PES updated patient, who was tearful and frightened that he would be here for the rest of his life  Patient continued with same issues and fears  Thomas Pardo, [place]    1009: PES received [facility]:  Patient is accepted at [facility]  Patient is accepted by Dr Vanessa Ewing per [facility]  Nurse report is to be called to [phone] prior to patient transfer  MS Kadi    1018: PES called [facility]:  Will call back with time  Thomas Pardo, [address] [facility]: [facility] called with time:  Transportation is arranged with [facility]  Transportation is scheduled for 1200  PES informed ED staff and [facility]    Thomas Pardo, [place]

## 2020-07-10 NOTE — ED NOTES
Patient remains sleeping  Even and unlabored respirations        [address] [facility]  07/10/20 6672

## 2020-07-10 NOTE — ED NOTES
Pt awake, walking with steady gait to and from the bathroom        [address] [facility], [address] [place] [address]  07/10/20 7655

## 2020-07-10 NOTE — ED NOTES
Patient remains asleep, no signs of distress noted, even and unlabored respirations        [address] [facility]  07/10/20 3492

## 2020-07-10 NOTE — ED NOTES
Pt up walking around in and out of room        [address] [facility]  07/10/20 2550

## 2020-07-10 NOTE — ED NOTES
Patient sleeping, no signs of distress noted        [address] [facility]  07/09/20 0465

## 2020-07-10 NOTE — ED NOTES
Pt sleeping, no signs of distress noted        [facility], [place]  07/10/20 6042

## 2020-07-10 NOTE — ED NOTES
Pt sleeping, even and unlabored respirations        Analy DU, [facility]  07/09/20 2032

## 2020-07-10 NOTE — ED NOTES
Patient sleeping, even and unlabored respirations       [address] [facility]  07/10/20 9262

## 2021-08-13 NOTE — PLAN OF CARE
Problem: Ineffective Coping  Goal: Participates in unit activities  Description  Interventions:  - Provide therapeutic environment   - Provide required programming   - Redirect inappropriate behaviors   Outcome: Progressing no